# Patient Record
Sex: FEMALE | Race: ASIAN | NOT HISPANIC OR LATINO | Employment: FULL TIME
[De-identification: names, ages, dates, MRNs, and addresses within clinical notes are randomized per-mention and may not be internally consistent; named-entity substitution may affect disease eponyms.]

---

## 2017-01-23 DIAGNOSIS — B97.89 STOMATITIS, VIRAL: Primary | ICD-10-CM

## 2017-01-23 DIAGNOSIS — K12.1 STOMATITIS, VIRAL: Primary | ICD-10-CM

## 2017-07-01 ENCOUNTER — HEALTH MAINTENANCE LETTER (OUTPATIENT)
Age: 32
End: 2017-07-01

## 2017-12-11 ENCOUNTER — TELEPHONE (OUTPATIENT)
Dept: OBGYN | Facility: CLINIC | Age: 32
End: 2017-12-11

## 2017-12-11 DIAGNOSIS — Z34.91 NORMAL PREGNANCY IN FIRST TRIMESTER: Primary | ICD-10-CM

## 2017-12-11 NOTE — TELEPHONE ENCOUNTER
Patient called and would like to establish prenatal care   Patient LMP 10/25/17  Patient   Patient complains of nothing at this time.  Patient is taking prenatal vitamins.

## 2017-12-29 ENCOUNTER — OFFICE VISIT (OUTPATIENT)
Dept: OBGYN | Facility: CLINIC | Age: 32
End: 2017-12-29
Attending: ADVANCED PRACTICE MIDWIFE
Payer: COMMERCIAL

## 2017-12-29 VITALS
DIASTOLIC BLOOD PRESSURE: 84 MMHG | BODY MASS INDEX: 32.93 KG/M2 | SYSTOLIC BLOOD PRESSURE: 132 MMHG | WEIGHT: 167.7 LBS | HEART RATE: 72 BPM | HEIGHT: 60 IN

## 2017-12-29 DIAGNOSIS — O09.91 SUPERVISION OF HIGH RISK PREGNANCY IN FIRST TRIMESTER: Primary | ICD-10-CM

## 2017-12-29 DIAGNOSIS — O34.219 PREVIOUS CESAREAN DELIVERY, ANTEPARTUM CONDITION OR COMPLICATION: ICD-10-CM

## 2017-12-29 DIAGNOSIS — Z34.91 NORMAL PREGNANCY IN FIRST TRIMESTER: ICD-10-CM

## 2017-12-29 LAB
ABO + RH BLD: NORMAL
ABO + RH BLD: NORMAL
BASOPHILS # BLD AUTO: 0 10E9/L (ref 0–0.2)
BASOPHILS NFR BLD AUTO: 0.2 %
BLD GP AB SCN SERPL QL: NORMAL
BLOOD BANK CMNT PATIENT-IMP: NORMAL
DEPRECATED CALCIDIOL+CALCIFEROL SERPL-MC: 19 UG/L (ref 20–75)
DIFFERENTIAL METHOD BLD: NORMAL
EOSINOPHIL # BLD AUTO: 0.1 10E9/L (ref 0–0.7)
EOSINOPHIL NFR BLD AUTO: 1.2 %
ERYTHROCYTE [DISTWIDTH] IN BLOOD BY AUTOMATED COUNT: 12.8 % (ref 10–15)
HBV SURFACE AG SERPL QL IA: NONREACTIVE
HCT VFR BLD AUTO: 41.2 % (ref 35–47)
HGB BLD-MCNC: 13.6 G/DL (ref 11.7–15.7)
HIV 1+2 AB+HIV1 P24 AG SERPL QL IA: NONREACTIVE
IMM GRANULOCYTES # BLD: 0 10E9/L (ref 0–0.4)
IMM GRANULOCYTES NFR BLD: 0.2 %
LYMPHOCYTES # BLD AUTO: 1.7 10E9/L (ref 0.8–5.3)
LYMPHOCYTES NFR BLD AUTO: 17.8 %
MCH RBC QN AUTO: 28.8 PG (ref 26.5–33)
MCHC RBC AUTO-ENTMCNC: 33 G/DL (ref 31.5–36.5)
MCV RBC AUTO: 87 FL (ref 78–100)
MONOCYTES # BLD AUTO: 0.4 10E9/L (ref 0–1.3)
MONOCYTES NFR BLD AUTO: 4.6 %
NEUTROPHILS # BLD AUTO: 7.1 10E9/L (ref 1.6–8.3)
NEUTROPHILS NFR BLD AUTO: 76 %
NRBC # BLD AUTO: 0 10*3/UL
NRBC BLD AUTO-RTO: 0 /100
PLATELET # BLD AUTO: 243 10E9/L (ref 150–450)
RBC # BLD AUTO: 4.72 10E12/L (ref 3.8–5.2)
RUBV IGG SERPL IA-ACNC: 17 IU/ML
SPECIMEN EXP DATE BLD: NORMAL
T PALLIDUM IGG+IGM SER QL: NEGATIVE
WBC # BLD AUTO: 9.3 10E9/L (ref 4–11)

## 2017-12-29 PROCEDURE — 86780 TREPONEMA PALLIDUM: CPT | Performed by: ADVANCED PRACTICE MIDWIFE

## 2017-12-29 PROCEDURE — 87340 HEPATITIS B SURFACE AG IA: CPT | Performed by: ADVANCED PRACTICE MIDWIFE

## 2017-12-29 PROCEDURE — 82306 VITAMIN D 25 HYDROXY: CPT | Performed by: ADVANCED PRACTICE MIDWIFE

## 2017-12-29 PROCEDURE — 86850 RBC ANTIBODY SCREEN: CPT | Performed by: ADVANCED PRACTICE MIDWIFE

## 2017-12-29 PROCEDURE — 85025 COMPLETE CBC W/AUTO DIFF WBC: CPT | Performed by: ADVANCED PRACTICE MIDWIFE

## 2017-12-29 PROCEDURE — 36415 COLL VENOUS BLD VENIPUNCTURE: CPT | Performed by: ADVANCED PRACTICE MIDWIFE

## 2017-12-29 PROCEDURE — 86900 BLOOD TYPING SEROLOGIC ABO: CPT | Performed by: ADVANCED PRACTICE MIDWIFE

## 2017-12-29 PROCEDURE — 86901 BLOOD TYPING SEROLOGIC RH(D): CPT | Performed by: ADVANCED PRACTICE MIDWIFE

## 2017-12-29 PROCEDURE — 87086 URINE CULTURE/COLONY COUNT: CPT | Performed by: ADVANCED PRACTICE MIDWIFE

## 2017-12-29 PROCEDURE — 83021 HEMOGLOBIN CHROMOTOGRAPHY: CPT | Performed by: ADVANCED PRACTICE MIDWIFE

## 2017-12-29 PROCEDURE — 86762 RUBELLA ANTIBODY: CPT | Performed by: ADVANCED PRACTICE MIDWIFE

## 2017-12-29 PROCEDURE — 87389 HIV-1 AG W/HIV-1&-2 AB AG IA: CPT | Performed by: ADVANCED PRACTICE MIDWIFE

## 2017-12-29 PROCEDURE — 76817 TRANSVAGINAL US OBSTETRIC: CPT | Mod: ZF

## 2017-12-29 PROCEDURE — 99212 OFFICE O/P EST SF 10 MIN: CPT | Mod: ZF

## 2017-12-29 RX ORDER — PRENATAL VIT/IRON FUM/FOLIC AC 27MG-0.8MG
1 TABLET ORAL DAILY
COMMUNITY
End: 2021-04-06

## 2017-12-29 NOTE — MR AVS SNAPSHOT
After Visit Summary   2017    Adenike Chavez    MRN: [id number]           Patient Information     Date Of Birth          1985        Visit Information        Provider Department      2017 10:00 AM Nikolay Alicia APRN CNM [facility]        Today's Diagnoses     Supervision of high risk pregnancy in first trimester    -  1    Previous  delivery, antepartum condition or complication           Follow-ups after your visit        Follow-up notes from your care team     Return in about 3 weeks (around 2018) for New OB Visit.      Your next 10 appointments already scheduled     2018  9:00 AM CST   NEW OB with MAGDA Madsen CNM   [facility] ([facility])    [facility]  [address]  [address]  [facility] [address] [phone]              Who to contact     Please call your clinic at [phone] to:    Ask questions about your health    Make or cancel appointments    Discuss your medicines    Learn about your test results    Speak to your doctor   If you have compliments or concerns about an experience at your clinic, or if you wish to file a complaint, please contact [facility] Patient Relations at [phone] or email us at [email or web address]         Additional Information About Your Visit        MyChart Information     [employer] gives you secure access to your electronic health record. If you see a primary care provider, you can also send messages to your care team and make appointments. If you have questions, please call your primary care clinic.  If you do not have a primary care provider, please call [phone] and they will assist you.      Secerno is an electronic gateway that provides easy, online access to your medical records. With [employer], you can request a clinic appointment, read your test results, renew a prescription or  communicate with your care team.     To access your existing account, please contact your [facility] or call [phone] for assistance.        Care EveryWhere ID     This is your Care EveryWhere ID. This could be used by other organizations to access your [place] medical records  [id number]        Your Vitals Were     Pulse Height Last Period BMI (Body Mass Index)          72 1.524 m (5') 10/25/2017 32.75 kg/m2         Blood Pressure from Last 3 Encounters:   12/29/17 132/84   09/13/16 (!) 132/93   10/28/14 127/88    Weight from Last 3 Encounters:   12/29/17 76.1 kg (167 lb 11.2 oz)   09/13/16 75.7 kg (166 lb 14.4 oz)   10/28/14 68.4 kg (150 lb 14.4 oz)              We Performed the Following     25- OH-Vitamin D     ABO/Rh Type and Screen     Anti Treponema     CBC with Platelets Differential     Hepatitis B Surface Antigen     Hgb with reflex to ELP or RBC Solubility (Sickle Cell Screen)     HIV Antigen Antibody Combo     Rubella Antibody IgG Quantitative     Urine Culture Aerobic Bacterial        Primary Care Provider Fax #    Physician No Ref-Primary [phone]       No address on file        Equal Access to Services     DUANE SAMPSON : Hadii jerald Marc, waaxda lurashawn, qaybta kaalmada adeashleyda, lulu samayoa. So [facility] [phone].    ATENCIÓN: Si habla español, tiene a burleson disposición servicios gratuitos de asistencia lingüística. [facility] [phone].    We comply with applicable federal civil rights laws and [place] laws. We do not discriminate on the basis of race, color, national origin, age, disability, sex, sexual orientation, or gender identity.            Thank you!     Thank you for choosing [facility]  for your care. Our goal is always to provide you with excellent care. Hearing back from our patients is one way we can continue to improve our services. Please take a few minutes to complete the  written survey that you may receive in the mail after your visit with us. Thank you!             Your Updated Medication List - Protect others around you: Learn how to safely use, store and throw away your medicines at www.disposemymeds.org.          This list is accurate as of: 12/29/17 10:25 AM.  Always use your most recent med list.                   Brand Name Dispense Instructions for use Diagnosis    albuterol (5 MG/ML) 0.5% neb solution    PROVENTIL    1 vial    Take 0.5 mLs (2.5 mg) by nebulization every 6 hours as needed for wheezing or shortness of breath / dyspnea    Cough       FIRST-ASIYA MOUTHWASH Susp     237 mL    Swish and swallow 5-10 mLs in mouth every 6 hours as needed    Stomatitis, viral       fluticasone 110 MCG/ACT Inhaler    FLOVENT HFA    1 Inhaler    Inhale 2 puffs into the lungs 2 times daily    Cough       prenatal multivitamin plus iron 27-0.8 MG Tabs per tablet      Take 1 tablet by mouth daily

## 2017-12-29 NOTE — PROGRESS NOTES
Subjective:  32 year old female who presents to clinic for initiation of OB care.   at 6w4d with estimated date of delivery of Aug 20, 2018 based on US.  Pregnancy is planned.  Symptoms since LMP include nausea.  Patient has tried these relief measures: increased rest.  Planning trip to [place] in couple weeks, questions regarding medications.  Co-morbids: hx of  x 2.  Medications: PNV  Reviewed dating ultrasound.     PERSONAL/SOCIAL HISTORY  Lives with their family. FOB, Girish, involved.  x 9 years.  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  Additional items: None    Objective  -VS: reviewed and within normal limits   -General appearance: no acute distress, patient is comfortable   NEUROLOGICAL/PSYCHIATRIC   - Orientated x3   -Mood and affect: normal   Genetic/Infection questionnaire completed, risks include none.    Assessment/Plan:   at 6w4d  by ultrasound  Encounter Diagnosis   Name Primary?     Supervision of high risk pregnancy in first trimester Yes     Orders Placed This Encounter   Procedures     25- OH-Vitamin D     Anti Treponema     CBC with Platelets Differential     Hepatitis B Surface Antigen     HIV Antigen Antibody Combo     Rubella Antibody IgG Quantitative     Hgb with reflex to ELP or RBC Solubility (Sickle Cell Screen)     ABO/Rh Type and Screen     Orders Placed This Encounter   Medications     Prenatal Vit-Fe Fumarate-FA (PRENATAL MULTIVITAMIN PLUS IRON) 27-0.8 MG TABS per tablet     Sig: Take 1 tablet by mouth daily     - Discussed medications that are safe to take during pregnancy regarding travel, motion sickness, etc.  - Oriented to Practice, types of care, and how to reach a provider. Discussed CNM vs. MD care. Planning repeat .   - Patient received 1st trimester new OB education packet complete with aide of The Expectant Family booklet including information on genetic screening test options.    - Patient undecided  about 1st trimester screening, will check with insurance.   - Patient was encouraged to continue prenatal vitamins as tolerated.    - Patient was sent to lab for routine OB labs including hgb ELP.    - Pregnancy concerns to be addressed by provider at new OB exam include: needs GC/CT and pap at next visit.  - Patient instructed to schedule new OB exam with provider at 3-4 weeks.

## 2017-12-29 NOTE — MR AVS SNAPSHOT
After Visit Summary   12/29/2017    Adenike Chavez    MRN: [id number]           Patient Information     Date Of Birth          1985        Visit Information        Provider Department      12/29/2017 9:30 AM [facility] ULTRASOUND [facility]        Today's Diagnoses     Normal pregnancy in first trimester           Follow-ups after your visit        Your next 10 appointments already scheduled     Feb 02, 2018  9:00 AM CST   NEW OB with MAGDA Madsen   [facility] ([facility])    [facility] [address]  [address]  [address]  [facility] [id number] [phone]              Who to contact     Please call your clinic at [phone] to:    Ask questions about your health    Make or cancel appointments    Discuss your medicines    Learn about your test results    Speak to your doctor   If you have compliments or concerns about an experience at your clinic, or if you wish to file a complaint, please contact [facility] Patient Relations at [phone] or email us at [email or web address]         Additional Information About Your Visit        MyChart Information     [employer] gives you secure access to your electronic health record. If you see a primary care provider, you can also send messages to your care team and make appointments. If you have questions, please call your primary care clinic.  If you do not have a primary care provider, please call [phone] and they will assist you.      [employer] is an electronic gateway that provides easy, online access to your medical records. With [employer], you can request a clinic appointment, read your test results, renew a prescription or communicate with your care team.     To access your existing account, please contact your [facility] or call [phone] for assistance.        Care EveryWhere ID     This is  your Care EveryWhere ID. This could be used by other organizations to access your [place] medical records  [id number]        Your Vitals Were     Last Period                   10/25/2017            Blood Pressure from Last 3 Encounters:   12/29/17 132/84   09/13/16 (!) 132/93   10/28/14 127/88    Weight from Last 3 Encounters:   12/29/17 76.1 kg (167 lb 11.2 oz)   09/13/16 75.7 kg (166 lb 14.4 oz)   10/28/14 68.4 kg (150 lb 14.4 oz)              We Performed the Following     Dating ultrasound less than 14 weeks gestation Transvag  - 10261 (In Clinic)        Primary Care Provider Fax #    Physician No Ref-Primary [phone]       No address on file        Equal Access to Services     DUANE SAMPSON : Irwin Marc, waaxpinky luqadaha, qaybta kaalmada jona, lulu gonzalez . So [facility] [phone].    ATENCIÓN: Si habla español, tiene a burleson disposición servicios gratuitos de asistencia lingüística. Llame al [phone].    We comply with applicable federal civil rights laws and [place] laws. We do not discriminate on the basis of race, color, national origin, age, disability, sex, sexual orientation, or gender identity.            Thank you!     Thank you for choosing [facility]  for your care. Our goal is always to provide you with excellent care. Hearing back from our patients is one way we can continue to improve our services. Please take a few minutes to complete the written survey that you may receive in the mail after your visit with us. Thank you!             Your Updated Medication List - Protect others around you: Learn how to safely use, store and throw away your medicines at www.disposemymeds.org.          This list is accurate as of: 12/29/17  1:34 PM.  Always use your most recent med list.                   Brand Name Dispense Instructions for use Diagnosis    albuterol (5 MG/ML) 0.5% neb solution    PROVENTIL    1 vial    Take 0.5 mLs  (2.5 mg) by nebulization every 6 hours as needed for wheezing or shortness of breath / dyspnea    Cough       FIRST-ASIYA MOUTHWASH Susp     237 mL    Swish and swallow 5-10 mLs in mouth every 6 hours as needed    Stomatitis, viral       fluticasone 110 MCG/ACT Inhaler    FLOVENT HFA    1 Inhaler    Inhale 2 puffs into the lungs 2 times daily    Cough       prenatal multivitamin plus iron 27-0.8 MG Tabs per tablet      Take 1 tablet by mouth daily

## 2017-12-29 NOTE — LETTER
Date:January 2, 2018      Patient was self referred, no letter generated. Do not send.        [facility] Phys[facility] Health Information

## 2017-12-29 NOTE — PROGRESS NOTES
32 year old female, , with LMP 10/25/2017, 9 2/7 weeks. Estimated Date of Delivery: 2018,  presents for confirmation of dates and assessment of viability. This study was done transvaginally.    Measurements     CRL = 6.5 mm = 6 4/7 weeks  EGA.   INDRA = 2018.     Fetal pole and yolk sac identified.     Fetal/Fetal Cardiac Activity: Present.  FHR = 136bpm.     Implantation: Intrauterine.     Cervix = 3.4 cm      Maternal structures appear normal.    Impression: US today changes EDC to 2018 (not consistent with LMP).    Recommend comprehensive scan at 18 to 20 weeks.    MIKE Murphy MD MPH

## 2017-12-29 NOTE — LETTER
2017       RE: Adenike Chavez  [address] [place] [address]     Dear Colleague,    Thank you for referring your patient, Adenike Chavez, to the [facility] at [facility]. Please see a copy of my visit note below.    Subjective:  32 year old female who presents to clinic for initiation of OB care.   at 6w4d with estimated date of delivery of Aug 20, 2018 based on US.  Pregnancy is planned.  Symptoms since LMP include nausea.  Patient has tried these relief measures: increased rest.  Planning trip to [place] in couple weeks, questions regarding medications.  Co-morbids: hx of  x 2.  Medications: PNV  Reviewed dating ultrasound.     PERSONAL/SOCIAL HISTORY  Lives with their family. Melba BROOKSi, involved.  x 9 years.  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  Additional items: None    Objective  -VS: reviewed and within normal limits   -General appearance: no acute distress, patient is comfortable   NEUROLOGICAL/PSYCHIATRIC   - Orientated x3   -Mood and affect: normal   Genetic/Infection questionnaire completed, risks include none.    Assessment/Plan:   at 6w4d  by ultrasound  Encounter Diagnosis   Name Primary?     Supervision of high risk pregnancy in first trimester Yes     Orders Placed This Encounter   Procedures     25- OH-Vitamin D     Anti Treponema     CBC with Platelets Differential     Hepatitis B Surface Antigen     HIV Antigen Antibody Combo     Rubella Antibody IgG Quantitative     Hgb with reflex to ELP or RBC Solubility (Sickle Cell Screen)     ABO/Rh Type and Screen     Orders Placed This Encounter   Medications     Prenatal Vit-Fe Fumarate-FA (PRENATAL MULTIVITAMIN PLUS IRON) 27-0.8 MG TABS per tablet     Sig: Take 1 tablet by mouth daily     - Discussed medications that are safe to take during pregnancy regarding travel, motion sickness, etc.  - Oriented to Practice, types  of care, and how to reach a provider. Discussed CNM vs. MD care. Planning repeat .   - Patient received 1st trimester new OB education packet complete with aide of The Expectant Family booklet including information on genetic screening test options.    - Patient undecided about 1st trimester screening, will check with insurance.   - Patient was encouraged to continue prenatal vitamins as tolerated.    - Patient was sent to lab for routine OB labs including hgb ELP.    - Pregnancy concerns to be addressed by provider at new OB exam include: needs GC/CT and pap at next visit.  - Patient instructed to schedule new OB exam with provider at 3-4 weeks.      Again, thank you for allowing me to participate in the care of your patient.      Sincerely,    MAGDA Lentz CNM

## 2017-12-29 NOTE — LETTER
Date:January 2, 2018      Patient was self referred, no letter generated. Do not send.        [facility] Physicians Health Information

## 2017-12-29 NOTE — LETTER
2017       RE: Adenike Chavez  [address] [place] [address]     Dear Colleague,    Thank you for referring your patient, Adenike Chavez, to the [facility] at [facility]. Please see a copy of my visit note below.    32 year old female, , with LMP 10/25/2017, 9 2/7 weeks. Estimated Date of Delivery: 2018,  presents for confirmation of dates and assessment of viability. This study was done transvaginally.    Measurements     CRL = 6.5 mm = 6 4/7 weeks  EGA.   INDRA = 2018.     Fetal pole and yolk sac identified.     Fetal/Fetal Cardiac Activity: Present.  FHR = 136bpm.     Implantation: Intrauterine.     Cervix = 3.4 cm      Maternal structures appear normal.    Impression: US today changes EDC to 2018 (not consistent with LMP).    Recommend comprehensive scan at 18 to 20 weeks.    MIKE Murphy MD MPH            Again, thank you for allowing me to participate in the care of your patient.      Sincerely,    [facility]

## 2017-12-30 LAB
BACTERIA SPEC CULT: NO GROWTH
HGB A1 MFR BLD: 96.7 % (ref 95–97.9)
HGB A2 MFR BLD: 2.9 % (ref 2–3.5)
HGB C MFR BLD: 0 % (ref 0–0)
HGB E MFR BLD: 0 % (ref 0–0)
HGB F MFR BLD: 0.4 % (ref 0–2.1)
HGB FRACT BLD ELPH-IMP: NORMAL
HGB OTHER MFR BLD: 0 % (ref 0–0)
HGB S BLD QL SOLY: NORMAL
HGB S MFR BLD: 0 % (ref 0–0)
Lab: NORMAL
PATH INTERP BLD-IMP: NORMAL
SPECIMEN SOURCE: NORMAL

## 2018-01-01 PROBLEM — E55.9 VITAMIN D DEFICIENCY: Status: ACTIVE | Noted: 2018-01-01

## 2018-02-02 ENCOUNTER — OFFICE VISIT (OUTPATIENT)
Dept: OBGYN | Facility: CLINIC | Age: 33
End: 2018-02-02
Attending: ADVANCED PRACTICE MIDWIFE
Payer: COMMERCIAL

## 2018-02-02 VITALS
HEART RATE: 91 BPM | BODY MASS INDEX: 33.22 KG/M2 | DIASTOLIC BLOOD PRESSURE: 82 MMHG | HEIGHT: 60 IN | SYSTOLIC BLOOD PRESSURE: 126 MMHG | WEIGHT: 169.2 LBS

## 2018-02-02 DIAGNOSIS — E55.9 VITAMIN D DEFICIENCY: ICD-10-CM

## 2018-02-02 DIAGNOSIS — Z11.3 SCREEN FOR STD (SEXUALLY TRANSMITTED DISEASE): ICD-10-CM

## 2018-02-02 DIAGNOSIS — Z12.4 SCREENING FOR MALIGNANT NEOPLASM OF CERVIX: ICD-10-CM

## 2018-02-02 DIAGNOSIS — O09.91 HIGH-RISK PREGNANCY IN FIRST TRIMESTER: Primary | ICD-10-CM

## 2018-02-02 PROCEDURE — G0145 SCR C/V CYTO,THINLAYER,RESCR: HCPCS | Performed by: ADVANCED PRACTICE MIDWIFE

## 2018-02-02 PROCEDURE — 87491 CHLMYD TRACH DNA AMP PROBE: CPT | Performed by: ADVANCED PRACTICE MIDWIFE

## 2018-02-02 PROCEDURE — G0463 HOSPITAL OUTPT CLINIC VISIT: HCPCS | Mod: ZF

## 2018-02-02 PROCEDURE — 87591 N.GONORRHOEAE DNA AMP PROB: CPT | Performed by: ADVANCED PRACTICE MIDWIFE

## 2018-02-02 PROCEDURE — 87624 HPV HI-RISK TYP POOLED RSLT: CPT | Performed by: ADVANCED PRACTICE MIDWIFE

## 2018-02-02 RX ORDER — CALCIUM CARBONATE 500 MG/1
1 TABLET, CHEWABLE ORAL DAILY
COMMUNITY
End: 2018-09-25

## 2018-02-02 NOTE — LETTER
2018       RE: Adenike Chavez  [address] [place] [address]     Dear Colleague,    Thank you for referring your patient, Adenike Chavez, to the [facility] at [facility]. Please see a copy of my visit note below.    SUBJECTIVE:    32 year old, female, , 11w4d,  who presents to the clinic today for a new ob visit.    Feels well. Has started PNV.  Estimated Date of Delivery: Aug 20, 2018   Aug 20, 2018 is calculated from Patient's last menstrual period was 10/25/2017..      She has not had bleeding since her LMP.   She has had mild nausea. Weight loss has not occurred.   This was a planned pregnancy.   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     OTHER CONCERNS: none    ===========================================  ROS  PSYCHIATRIC:  Denies mood changes  PHQ9: Last PHQ-9 score on record= 0  Social History   Substance Use Topics     Smoking status: Never Smoker     Smokeless tobacco: Never Used     Alcohol use No      Comment: Not with pregnancy - once every 2 months     History   Drug Use No     History   Smoking Status     Never Smoker   Smokeless Tobacco     Never Used       Alcohol use No   Comment: Not with pregnancy - once every 2 months     Family History   Problem Relation Age of Onset     Hypertension Father      stroke     Genitourinary Problems Maternal Grandmother      kidney      ============================================  MEDICAL HISTORY   No Known Allergies    [unfilled]      Current Outpatient Prescriptions:      calcium carbonate (TUMS) 500 MG chewable tablet, Take 1 chew tab by mouth daily, Disp: , Rfl:      Prenatal Vit-Fe Fumarate-FA (PRENATAL MULTIVITAMIN PLUS IRON) 27-0.8 MG TABS per tablet, Take 1 tablet by mouth daily, Disp: , Rfl:      Diphenhyd-HC-Nystatin-Tetracyc (FIRST-ASIYA MOUTHWASH) SUSP, Swish and swallow 5-10 mLs in mouth every 6 hours as needed (Patient not taking: Reported on 2017), Disp:  237 mL, Rfl: 1     fluticasone (FLOVENT HFA) 110 MCG/ACT inhaler, Inhale 2 puffs into the lungs 2 times daily (Patient not taking: Reported on 2017), Disp: 1 Inhaler, Rfl: 1     albuterol (PROVENTIL) (5 MG/ML) 0.5% nebulizer solution, Take 0.5 mLs (2.5 mg) by nebulization every 6 hours as needed for wheezing or shortness of breath / dyspnea (Patient not taking: Reported on 2017), Disp: 1 vial, Rfl: 1  No current facility-administered medications for this visit.     Facility-Administered Medications Ordered in Other Visits:      lidocaine-EPINEPHrine 1.5 %-1:[id number] injection, , EPIDURAL, PRN, Rohit Patel MD, 3 mL at 14 0240     fentaNYL (SUBLIMAZE) 2 mcg/mL, bupivacaine (MARCAINE) 0.125% in  mL EPIDURAL Drip, , EPIDURAL, Continuous PRN, Rohit Patel MD, Last Rate: 14 mL/hr at 14 0244, 14 mL/hr at 14 0244     bupivacaine HCl 0.25 % preservative free injection SOLN, , EPIDURAL, PRN, Rohit Patel MD, 12 mL at 14 0240     fentaNYL (SUBLIMAZE) injection, , EPIDURAL, PRN, Rohit Patel MD, 100 mcg at 14 0240    Past Medical History:   Diagnosis Date     NO ACTIVE PROBLEMS        Past Surgical History:   Procedure Laterality Date     C/SECTION, LOW TRANSVERSE  9/12/10    fail to dilate - malposition      SECTION N/A 2014    Procedure:  SECTION;  Surgeon: Tyra Richardson MD;  Location: [facility] L+D     HC TOOTH EXTRACTION W/FORCEP      wisdom - age 21 years old            Obstetric History       T2      L2     SAB0   TAB0   Ectopic0   Multiple0   Live Births2       # Outcome Date GA Lbr Lorne/2nd Weight Sex Delivery Anes PTL Lv   3 Current            2 Term 14 39w0d / 05:38 3.487 kg (7 lb 11 oz) M CS-LTranv EPI  RAQUEL      Apgar1:  9               Apgar5: 10   1 Term 09/12/10 39w5d  3.515 kg (7 lb 12 oz) M -SEC   RAQUEL      Name: jennie      Apgar1:  8                Apgar5: 9      Obstetric Comments   No GDM, HTN, or PPH.       GYN  History- Denies Abnormal Pap Smears                        Cervical procedures: none                        History of STI: none    I personally reviewed the past social/family/medical and surgical history on the date of service.   I reviewed lab work done at Intake visit with patient.    OBJECTIVE:   PHYSICAL EXAM:  /82  Pulse 91  Ht 1.524 m (5')  Wt 76.7 kg (169 lb 3.2 oz)  LMP 10/25/2017  BMI 33.04 kg/m2  BMI- Body mass index is 33.04 kg/(m^2)., GENERAL:  Pleasant pregnant female, alert, cooperative and well groomed.  SKIN:  Warm and dry, without lesions or rashes  HEAD: Symmetrical features.  MOUTH:  Buccal mucosa pink, moist without lesions.  Teeth in good repair.    NECK:  Thyroid without enlargement and nodules.  Lymph nodes not palpable.   LUNGS:  Clear to auscultation.  BREAST:    No dominant, fixed or suspicious masses are noted.  No skin or nipple changes or axillary nodes.   Nipples everted.      HEART:  RRR without murmur.  ABDOMEN: Soft without masses , tenderness or organomegaly.  No CVA tenderness.  Uterus not palpable at size equal to dates.  Well healed scar from  section. Fetal heart tones present.  MUSCULOSKELETAL:  Full range of motion  EXTREMITIES:  No edema. No significant varicosities.   PELVIC EXAM:  GENITALIA: EGBUS  External genitalia, Bartholin's glands, urethra & [place]'s glands:normal. Vulva reveals no erythema or lesions.         VAGINA:  pink, normal rugae and discharge, no lesions, good tone.   CERVIX:  smooth, without discharge or CMT.                RECTAL:  Normal appearance.  Digital exam deferred.  WET PREP:Not done  GC/CHLAMYDIA CULTURE OBTAINED:YES    ASSESSMENT:  Intrauterine pregnancy 11w4d size consistent with dates  Genetic Screening: Declines early screening  Encounter Diagnoses   Name Primary?     High-risk pregnancy in first trimester Yes     Screening for malignant neoplasm of cervix      Screen for STD (sexually transmitted disease)          PLAN:  Orders Placed This Encounter   Procedures     Obtaining, preparing and conveyance of cervical or vaginal smear to laboratory.     Pap imaged thin layer screen with HPV - recommended age 30 - 65 years (select HPV order below)     HPV High Risk Types DNA Cervical     Orders Placed This Encounter   Medications     calcium carbonate (TUMS) 500 MG chewable tablet     Sig: Take 1 chew tab by mouth daily     - Reviewed use of triage nurse line and contacting the on-call provider after hours for an urgent need such as fever, vagina bleeding, bladder or vaginal infection, rupture of membranes,  or term labor.    - Reviewed best evidence for: weight gain for her weight and height for pregnancy:  RECOMMENDED WEIGHT GAIN: < 15 lbs.   healthy diet and foods to avoid; exercise and activity during pregnancy;avoiding exposure to toxoplasmosis; and maintenance of a generally healthy lifestyle.   - Discussed the harms, benefits, side effects and alternative therapies for current prescribed and OTC medications.    - All pt's and FOB's  questions discussed and answered.  Pt verbalized understanding of and agreement to plan of care.     - Continue scheduled prenatal care and prn if questions or concerns    MAGDA Madsen CNM                 Again, thank you for allowing me to participate in the care of your patient.      Sincerely,    MAGDA Madsen CNM

## 2018-02-02 NOTE — PROGRESS NOTES
SUBJECTIVE:    32 year old, female, , 11w4d,  who presents to the clinic today for a new ob visit.    Feels well. Has started PNV.  Estimated Date of Delivery: Aug 20, 2018   Aug 20, 2018 is calculated from Patient's last menstrual period was 10/25/2017..      She has not had bleeding since her LMP.   She has had mild nausea. Weight loss has not occurred.   This was a planned pregnancy.   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     OTHER CONCERNS: none    ===========================================  ROS  PSYCHIATRIC:  Denies mood changes  PHQ9: Last PHQ-9 score on record= 0  Social History   Substance Use Topics     Smoking status: Never Smoker     Smokeless tobacco: Never Used     Alcohol use No      Comment: Not with pregnancy - once every 2 months     History   Drug Use No     History   Smoking Status     Never Smoker   Smokeless Tobacco     Never Used       Alcohol use No   Comment: Not with pregnancy - once every 2 months     Family History   Problem Relation Age of Onset     Hypertension Father      stroke     Genitourinary Problems Maternal Grandmother      kidney      ============================================  MEDICAL HISTORY   No Known Allergies    [unfilled]      Current Outpatient Prescriptions:      calcium carbonate (TUMS) 500 MG chewable tablet, Take 1 chew tab by mouth daily, Disp: , Rfl:      Prenatal Vit-Fe Fumarate-FA (PRENATAL MULTIVITAMIN PLUS IRON) 27-0.8 MG TABS per tablet, Take 1 tablet by mouth daily, Disp: , Rfl:      Diphenhyd-HC-Nystatin-Tetracyc (FIRST-ASIYA MOUTHWASH) SUSP, Swish and swallow 5-10 mLs in mouth every 6 hours as needed (Patient not taking: Reported on 2017), Disp: 237 mL, Rfl: 1     fluticasone (FLOVENT HFA) 110 MCG/ACT inhaler, Inhale 2 puffs into the lungs 2 times daily (Patient not taking: Reported on 2017), Disp: 1 Inhaler, Rfl: 1     albuterol (PROVENTIL) (5 MG/ML) 0.5% nebulizer solution, Take 0.5 mLs (2.5 mg) by nebulization every  6 hours as needed for wheezing or shortness of breath / dyspnea (Patient not taking: Reported on 2017), Disp: 1 vial, Rfl: 1  No current facility-administered medications for this visit.     Facility-Administered Medications Ordered in Other Visits:      lidocaine-EPINEPHrine 1.5 %-[id number] injection, , EPIDURAL, PRN, Rohit Patel MD, 3 mL at 14 0240     fentaNYL (SUBLIMAZE) 2 mcg/mL, bupivacaine (MARCAINE) 0.125% in  mL EPIDURAL Drip, , EPIDURAL, Continuous PRN, Rohit Patel MD, Last Rate: 14 mL/hr at 14 0244, 14 mL/hr at 14 0244     bupivacaine HCl 0.25 % preservative free injection SOLN, , EPIDURAL, PRN, Rohit Patel MD, 12 mL at 14 0240     fentaNYL (SUBLIMAZE) injection, , EPIDURAL, PRN, Rohit Patel MD, 100 mcg at 14 0240    Past Medical History:   Diagnosis Date     NO ACTIVE PROBLEMS        Past Surgical History:   Procedure Laterality Date     C/SECTION, LOW TRANSVERSE  9/12/10    fail to dilate - malposition      SECTION N/A 2014    Procedure:  SECTION;  Surgeon: Tyra Richardson MD;  Location: [facility] L[facility]     HC TOOTH EXTRACTION W/FORCEP      wisdom - age 21 years old            Obstetric History       T2      L2     SAB0   TAB0   Ectopic0   Multiple0   Live Births2       # Outcome Date GA Lbr Lorne/2nd Weight Sex Delivery Anes PTL Lv   3 Current            2 Term 14 39w0d / 05:38 3.487 kg (7 lb 11 oz) M CS-LTranv EPI  RAQUEL      Apgar1:  9               Apgar5: 10   1 Term 09/12/10 39w5d  3.515 kg (7 lb 12 oz) M -SEC   RAQUEL      Name: jennie      Apgar1:  8                Apgar5: 9      Obstetric Comments   No GDM, HTN, or PPH.       GYN History- Denies Abnormal Pap Smears                        Cervical procedures: none                        History of STI: none    I personally reviewed the past social/family/medical and surgical history on the date of service.   I reviewed lab work done at Intake visit with  patient.    OBJECTIVE:   PHYSICAL EXAM:  /82  Pulse 91  Ht 1.524 m (5')  Wt 76.7 kg (169 lb 3.2 oz)  LMP 10/25/2017  BMI 33.04 kg/m2  BMI- Body mass index is 33.04 kg/(m^2)., GENERAL:  Pleasant pregnant female, alert, cooperative and well groomed.  SKIN:  Warm and dry, without lesions or rashes  HEAD: Symmetrical features.  MOUTH:  Buccal mucosa pink, moist without lesions.  Teeth in good repair.    NECK:  Thyroid without enlargement and nodules.  Lymph nodes not palpable.   LUNGS:  Clear to auscultation.  BREAST:    No dominant, fixed or suspicious masses are noted.  No skin or nipple changes or axillary nodes.   Nipples everted.      HEART:  RRR without murmur.  ABDOMEN: Soft without masses , tenderness or organomegaly.  No CVA tenderness.  Uterus not palpable at size equal to dates.  Well healed scar from  section. Fetal heart tones present.  MUSCULOSKELETAL:  Full range of motion  EXTREMITIES:  No edema. No significant varicosities.   PELVIC EXAM:  GENITALIA: EGBUS  External genitalia, Bartholin's glands, urethra & Shorter's glands:normal. Vulva reveals no erythema or lesions.         VAGINA:  pink, normal rugae and discharge, no lesions, good tone.   CERVIX:  smooth, without discharge or CMT.                RECTAL:  Normal appearance.  Digital exam deferred.  WET PREP:Not done  GC/CHLAMYDIA CULTURE OBTAINED:YES    ASSESSMENT:  Intrauterine pregnancy 11w4d size consistent with dates  Genetic Screening: Declines early screening  Encounter Diagnoses   Name Primary?     High-risk pregnancy in first trimester Yes     Screening for malignant neoplasm of cervix      Screen for STD (sexually transmitted disease)         PLAN:  Orders Placed This Encounter   Procedures     Obtaining, preparing and conveyance of cervical or vaginal smear to laboratory.     Pap imaged thin layer screen with HPV - recommended age 30 - 65 years (select HPV order below)     HPV High Risk Types DNA Cervical     Orders Placed  This Encounter   Medications     calcium carbonate (TUMS) 500 MG chewable tablet     Sig: Take 1 chew tab by mouth daily     - Reviewed use of triage nurse line and contacting the on-call provider after hours for an urgent need such as fever, vagina bleeding, bladder or vaginal infection, rupture of membranes,  or term labor.    - Reviewed best evidence for: weight gain for her weight and height for pregnancy:  RECOMMENDED WEIGHT GAIN: < 15 lbs.   healthy diet and foods to avoid; exercise and activity during pregnancy;avoiding exposure to toxoplasmosis; and maintenance of a generally healthy lifestyle.   - Discussed the harms, benefits, side effects and alternative therapies for current prescribed and OTC medications.    - All pt's and FOB's  questions discussed and answered.  Pt verbalized understanding of and agreement to plan of care.     - Continue scheduled prenatal care and prn if questions or concerns    MAGDA Madsen CNM

## 2018-02-02 NOTE — PATIENT INSTRUCTIONS
"  Thank you for choosing [facility] for your obstetrical care.  We are an integrated health clinic with obstetricians, midwives, a psychologist, an acupuncturist, a nutritionist, a pharmacist, internal medicine and family practice all in one.  If you have questions about services offered please ask.      o Please keep all appointments with your provider.  You will help catch, prevent and treat problems early.  o Review your Expectant Family booklet and folder given to you at this intake visit.  It can answer many basic questions including:    Treatment for nausea and vomiting    Medications that are safe in pregnancy    Healthy diet and weight gain    Exercise and activity  o ASK questions!!  Please use \"Questions for my New OB visit\" form to write down any questions or concerns for your next visit.  o Eat a healthy diet.  Visit www.choosemyplate.gov and click on  Pregnancy and Breastfeeding  for information and tips  o Do not smoke.  Avoid other people's smoke, too.  We are happy to help with referrals to stop smoking programs.  o Do not drink alcohol.  o Try to avoid people who have colds or other infections.  Practice good hand washing.  o Consider registering for our Healthy Pregnancy Class here at [facility].  This class is offered every 3rd Wednesday from 2:30-4:30 p.m.  [place] at [phone] or online at [email or web address] or [email or web address]  o Consider registering for prenatal education classes through [place] at [facility].  You can view class schedules and register online at [email or web address] or call [phone] for questions     For urgent concerns, call [facility] at [phone] to speak with a triage nurse during regular clinic hours (8:00 am - 4:30 pm).  This line is answered by our service 24 hours a day, after hours a provider will return your call.  "

## 2018-02-02 NOTE — MR AVS SNAPSHOT
"              After Visit Summary   2/2/2018    Adenike Chavez    MRN: [id number]           Patient Information     Date Of Birth          1985        Visit Information        Provider Department      2/2/2018 9:00 AM Bridget Maldonado APRN CNM [facility]        Today's Diagnoses     High-risk pregnancy in first trimester    -  1    Screening for malignant neoplasm of cervix        Screen for STD (sexually transmitted disease)        Vitamin D deficiency          Care Instructions      Thank you for choosing [facility] for your obstetrical care.  We are an integrated health clinic with obstetricians, midwives, a psychologist, an acupuncturist, a nutritionist, a pharmacist, internal medicine and family practice all in one.  If you have questions about services offered please ask.      o Please keep all appointments with your provider.  You will help catch, prevent and treat problems early.  o Review your Expectant Family booklet and folder given to you at this intake visit.  It can answer many basic questions including:    Treatment for nausea and vomiting    Medications that are safe in pregnancy    Healthy diet and weight gain    Exercise and activity  o ASK questions!!  Please use \"Questions for my New OB visit\" form to write down any questions or concerns for your next visit.  o Eat a healthy diet.  Visit www.choosemyplate.gov and click on  Pregnancy and Breastfeeding  for information and tips  o Do not smoke.  Avoid other people's smoke, too.  We are happy to help with referrals to stop smoking programs.  o Do not drink alcohol.  o Try to avoid people who have colds or other infections.  Practice good hand washing.  o Consider registering for our Healthy Pregnancy Class here at [facility].  This class is offered every 3rd Wednesday from 2:30-4:30 p.m.  [place] at [phone] or online at [email or web address] or [email or web address]  o Consider " registering for prenatal education classes through [place] at [facility].  You can view class schedules and register online at [email or web address] or call [phone] for questions     For urgent concerns, call [facility] at [phone] to speak with a triage nurse during regular clinic hours (8:00 am - 4:30 pm).  This line is answered by our service 24 hours a day, after hours a provider will return your call.          Follow-ups after your visit        Follow-up notes from your care team     Return in about 4 weeks (around 3/2/2018) for THALIA NAM only.      Who to contact     Please call your clinic at [phone] to:    Ask questions about your health    Make or cancel appointments    Discuss your medicines    Learn about your test results    Speak to your doctor   If you have compliments or concerns about an experience at your clinic, or if you wish to file a complaint, please contact [facility] Patient Relations at [phone] or email us at [email or web address]         Additional Information About Your Visit        Accelerated IOhart Information     BrandContt gives you secure access to your electronic health record. If you see a primary care provider, you can also send messages to your care team and make appointments. If you have questions, please call your primary care clinic.  If you do not have a primary care provider, please call [phone] and they will assist you.      allyve is an electronic gateway that provides easy, online access to your medical records. With allyve, you can request a clinic appointment, read your test results, renew a prescription or communicate with your care team.     To access your existing account, please contact your [facility] or call [phone] for assistance.        Care EveryWhere ID     This is your Care EveryWhere ID. This could be used by other organizations to access your [place]  medical records  [id number]        Your Vitals Were     Pulse Height Last Period BMI (Body Mass Index)          91 1.524 m (5') 10/25/2017 33.04 kg/m2         Blood Pressure from Last 3 Encounters:   02/02/18 126/82   12/29/17 132/84   09/13/16 (!) 132/93    Weight from Last 3 Encounters:   02/02/18 76.7 kg (169 lb 3.2 oz)   12/29/17 76.1 kg (167 lb 11.2 oz)   09/13/16 75.7 kg (166 lb 14.4 oz)              We Performed the Following     Chlamydia Trachomatis PCR [BEA311]     Gonorrhea PCR [[id number]]     HPV High Risk Types DNA Cervical     Obtaining, preparing and conveyance of cervical or vaginal smear to laboratory.     Pap imaged thin layer screen with HPV - recommended age 30 - 65 years (select HPV order below)        Primary Care Provider Fax #    Physician No Ref-Primary [phone]       No address on file        Equal Access to Services     DUANE SAMPSON : Hadrigo dominguezo Socrystal, wairisda lurashawn, qaybta kaalmada jona, lulu gonzalez . So [facility] [phone].    ATENCIÓN: Si margarette hurst, tiene a burleson disposición servicios gratuitos de asistencia lingüística. Llame al [phone].    We comply with applicable federal civil rights laws and [place] laws. We do not discriminate on the basis of race, color, national origin, age, disability, sex, sexual orientation, or gender identity.            Thank you!     Thank you for choosing [facility]  for your care. Our goal is always to provide you with excellent care. Hearing back from our patients is one way we can continue to improve our services. Please take a few minutes to complete the written survey that you may receive in the mail after your visit with us. Thank you!             Your Updated Medication List - Protect others around you: Learn how to safely use, store and throw away your medicines at www.disposemymeds.org.          This list is accurate as of 2/2/18  9:33 AM.  Always use your most  recent med list.                   Brand Name Dispense Instructions for use Diagnosis    albuterol (5 MG/ML) 0.5% neb solution    PROVENTIL    1 vial    Take 0.5 mLs (2.5 mg) by nebulization every 6 hours as needed for wheezing or shortness of breath / dyspnea    Cough       calcium carbonate 500 MG chewable tablet    TUMS     Take 1 chew tab by mouth daily        FIRST-ASIYA MOUTHWASH Susp     237 mL    Swish and swallow 5-10 mLs in mouth every 6 hours as needed    Stomatitis, viral       fluticasone 110 MCG/ACT Inhaler    FLOVENT HFA    1 Inhaler    Inhale 2 puffs into the lungs 2 times daily    Cough       prenatal multivitamin plus iron 27-0.8 MG Tabs per tablet      Take 1 tablet by mouth daily

## 2018-02-02 NOTE — LETTER
Date:February 5, 2018      Patient was self referred, no letter generated. Do not send.        [facility] Health Information

## 2018-02-04 LAB
C TRACH DNA SPEC QL NAA+PROBE: NEGATIVE
N GONORRHOEA DNA SPEC QL NAA+PROBE: NEGATIVE
SPECIMEN SOURCE: NORMAL
SPECIMEN SOURCE: NORMAL

## 2018-02-06 LAB
COPATH REPORT: NORMAL
PAP: NORMAL

## 2018-02-08 LAB
FINAL DIAGNOSIS: NORMAL
HPV HR 12 DNA CVX QL NAA+PROBE: NEGATIVE
HPV16 DNA SPEC QL NAA+PROBE: NEGATIVE
HPV18 DNA SPEC QL NAA+PROBE: NEGATIVE
SPECIMEN DESCRIPTION: NORMAL
SPECIMEN SOURCE CVX/VAG CYTO: NORMAL

## 2018-03-13 ENCOUNTER — OFFICE VISIT (OUTPATIENT)
Dept: OBGYN | Facility: CLINIC | Age: 33
End: 2018-03-13
Attending: OBSTETRICS & GYNECOLOGY
Payer: COMMERCIAL

## 2018-03-13 VITALS
DIASTOLIC BLOOD PRESSURE: 83 MMHG | HEIGHT: 60 IN | BODY MASS INDEX: 34.47 KG/M2 | WEIGHT: 175.6 LBS | HEART RATE: 92 BPM | SYSTOLIC BLOOD PRESSURE: 130 MMHG

## 2018-03-13 DIAGNOSIS — Z34.82 PRENATAL CARE, SUBSEQUENT PREGNANCY, SECOND TRIMESTER: Primary | ICD-10-CM

## 2018-03-13 NOTE — PROGRESS NOTES
33 yo  at 17+1 with c/s x 2 here for routine OB. 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 Debra did last c/s kids all 4 years apart.  Planning on tubal ligation. Discussed salpingectomy.  Declines genetic testing, but does want level 2 ultrasound.    O see flowsheet    A/P IUP at 17+1  Plans repeat c/s at 39 weeks with salpingectomy  Discussed weight gain gola of 25 #.  RTC in 5 weeks  Ordered level 2 sono.    Tyra Richardson MD

## 2018-03-13 NOTE — LETTER
Date:March 14, 2018      Patient was self referred, no letter generated. Do not send.        [facility] Health Information

## 2018-03-13 NOTE — MR AVS SNAPSHOT
After Visit Summary   3/13/2018    Adenike Chavez    MRN: [id number]           Patient Information     Date Of Birth          1985        Visit Information        Provider Department      3/13/2018 2:45 PM Tyra Richardson MD [facility]        Today's Diagnoses     Prenatal care, subsequent pregnancy, second trimester    -  1       Follow-ups after your visit        Additional Services     MAT FETAL MED CTR REFERRAL-PREGNANCY       >> Patient may proceed with recommendations for further testing as directed by the Maternal Fetal Medicine Specialist >>    >> If requesting Fetal Echo: MFM will determine appropriate location for exam due to indication.    >> If requesting Lung Maturity Amnio:  If results indicate fetal lung maturity, induction or C/S is recommended within 36 hours.  Please schedule accordingly.     Dear Patient:   Please be aware that coverage of these services is subject to the terms and limitations of your health insurance plan.  Call member services at your health plan with any benefit or coverage questions.      Please bring the following to your appointment:    >>  Any x-rays, CTs or MRIs which have been performed.  Contact the facility where they were done to arrange for  prior to your scheduled appointment.  Any new CT, MRI or other procedures ordered by your specialist must be performed at a [place] facility or coordinated by your clinic's referral office.  >>  List of current medications   >>  This referral request   >>  Any documents/labs given to you for this referral                  Your next 10 appointments already scheduled     Apr 18, 2018  3:30 PM CDT   RETURN OB with Tyra Richardson MD   [facility] ([facility])    [facility] [address]  [address]  [facility] [id number] [phone]              Who to contact     Please call your clinic at [phone]  to:    Ask questions about your health    Make or cancel appointments    Discuss your medicines    Learn about your test results    Speak to your doctor            Additional Information About Your Visit        MyWishBoardharNutmeg Information     ChemDAQ gives you secure access to your electronic health record. If you see a primary care provider, you can also send messages to your care team and make appointments. If you have questions, please call your primary care clinic.  If you do not have a primary care provider, please call [phone] and they will assist you.      ChemDAQ is an electronic gateway that provides easy, online access to your medical records. With ChemDAQ, you can request a clinic appointment, read your test results, renew a prescription or communicate with your care team.     To access your existing account, please contact your [facility] or call [phone] for assistance.        Care EveryWhere ID     This is your Care EveryWhere ID. This could be used by other organizations to access your [place] medical records  [id number]        Your Vitals Were     Pulse Height Last Period BMI (Body Mass Index)          92 1.524 m (5') 10/25/2017 34.29 kg/m2         Blood Pressure from Last 3 Encounters:   03/13/18 130/83   02/02/18 126/82   12/29/17 132/84    Weight from Last 3 Encounters:   03/13/18 79.7 kg (175 lb 9.6 oz)   02/02/18 76.7 kg (169 lb 3.2 oz)   12/29/17 76.1 kg (167 lb 11.2 oz)              We Performed the Following     MAT FETAL MED CTR REFERRAL-PREGNANCY        Primary Care Provider Fax #    Physician No Ref-Primary [phone]       No address on file        Equal Access to Services     COURTNEY [facility] : Hadii jerald martin Socrystal, waaxda luqadaha, qaybta kaalmalulu calderon . So [facility] [phone].    ATENCIÓN: Si habla español, tiene a burleson disposición servicios gratuitos de asistencia lingüística. Llame al  [phone].    We comply with applicable federal civil rights laws and [place] laws. We do not discriminate on the basis of race, color, national origin, age, disability, sex, sexual orientation, or gender identity.            Thank you!     Thank you for choosing [facility]  for your care. Our goal is always to provide you with excellent care. Hearing back from our patients is one way we can continue to improve our services. Please take a few minutes to complete the written survey that you may receive in the mail after your visit with us. Thank you!             Your Updated Medication List - Protect others around you: Learn how to safely use, store and throw away your medicines at www.disposemymeds.org.          This list is accurate as of 3/13/18  3:32 PM.  Always use your most recent med list.                   Brand Name Dispense Instructions for use Diagnosis    albuterol (5 MG/ML) 0.5% neb solution    PROVENTIL    1 vial    Take 0.5 mLs (2.5 mg) by nebulization every 6 hours as needed for wheezing or shortness of breath / dyspnea    Cough       calcium carbonate 500 MG chewable tablet    TUMS     Take 1 chew tab by mouth daily        [facility]-ASIYA MOUTHWASH Susp     237 mL    Swish and swallow 5-10 mLs in mouth every 6 hours as needed    Stomatitis, viral       fluticasone 110 MCG/ACT Inhaler    FLOVENT HFA    1 Inhaler    Inhale 2 puffs into the lungs 2 times daily    Cough       prenatal multivitamin plus iron 27-0.8 MG Tabs per tablet      Take 1 tablet by mouth daily

## 2018-03-13 NOTE — LETTER
3/13/2018       RE: Adenike Chavez  [address] [place] [address]     Dear Colleague,    Thank you for referring your patient, Adenike Chavez, to the [facility] at [facility]. Please see a copy of my visit note below.    33 yo  at 17+1 with c/s x 2 here for routine OB. 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  Planning on tubal ligation. Discussed salpingectomy.  Declines genetic testing, but does want level 2 ultrasound.    O see flowsheet    A/P IUP at 17+1  Plans repeat c/s at 39 weeks with salpingectomy  Discussed weight gain gola of 25 #.  RTC in 5 weeks  Ordered level 2 sono.    Tyra Richardson MD          Again, thank you for allowing me to participate in the care of your patient.      Sincerely,    Tyra Richardson MD

## 2018-04-03 ENCOUNTER — PRE VISIT (OUTPATIENT)
Dept: MATERNAL FETAL MEDICINE | Facility: CLINIC | Age: 33
End: 2018-04-03

## 2018-04-06 ENCOUNTER — OFFICE VISIT (OUTPATIENT)
Dept: MATERNAL FETAL MEDICINE | Facility: CLINIC | Age: 33
End: 2018-04-06
Attending: OBSTETRICS & GYNECOLOGY
Payer: COMMERCIAL

## 2018-04-06 ENCOUNTER — HOSPITAL ENCOUNTER (OUTPATIENT)
Dept: ULTRASOUND IMAGING | Facility: CLINIC | Age: 33
Discharge: HOME | End: 2018-04-06
Attending: OBSTETRICS & GYNECOLOGY | Admitting: OBSTETRICS & GYNECOLOGY
Payer: COMMERCIAL

## 2018-04-06 DIAGNOSIS — O26.90 PREGNANCY RELATED CONDITION, ANTEPARTUM: ICD-10-CM

## 2018-04-06 DIAGNOSIS — O35.9XX0 SUSPECTED FETAL ANOMALY, ANTEPARTUM, SINGLE OR UNSPECIFIED FETUS: Primary | ICD-10-CM

## 2018-04-06 PROCEDURE — 76811 OB US DETAILED SNGL FETUS: CPT

## 2018-04-06 NOTE — PROGRESS NOTES
Please refer to ultrasound report under 'Imaging' Studies of 'Chart Review' tabs.    Magdaleno Berry M.D.

## 2018-04-06 NOTE — MR AVS SNAPSHOT
After Visit Summary   4/6/2018    Adenike Chavez    MRN: [id number]           Patient Information     Date Of Birth          1985        Visit Information        Provider Department      4/6/2018 10:00 AM Magdaleno Berry MD [facility] Maternal Fetal Medicine [facility]        Today's Diagnoses     Suspected fetal anomaly, antepartum, single or unspecified fetus    -  1       Follow-ups after your visit        Your next 10 appointments already scheduled     Apr 18, 2018  3:30 PM CDT   RETURN OB with Tyra Richardson MD   [facility] ([facility])    [place] [facility] [address]  [address]  [facility] [address]   [phone]            May 04, 2018 10:15 AM CDT   (Arrive by 10:00 AM)   MFM US COMPRE SINGLE F/U with [id number]   [facility] Maternal Fetal Medicine Ultrasound - [place] ([facility])    [address]  [facility] [address] [phone]           Wear comfortable clothes and leave your valuables at home.            May 04, 2018 10:45 AM CDT   Radiology MD with UR JENNI NAM   [facility] Maternal Fetal Medicine - [place] ([facility])    [address]  [facility] [address]   [phone]           Please arrive at the time given for your first appointment. This visit is used internally to schedule the physician's time during your ultrasound.              Future tests that were ordered for you today     Open Future Orders        Priority Expected Expires Ordered    MFM US Comprehensive Single F/U Routine 5/4/2018 4/6/2019 4/6/2018            Who to contact     If you have questions or need follow up information about today's clinic visit or your schedule please contact [facility] MATERNAL FETAL MEDICINE [facility] directly at [phone].  Normal or non-critical lab and imaging results will be communicated to you by MyChart, letter or phone within  4 business days after the clinic has received the results. If you do not hear from us within 7 days, please contact the clinic through [employer] or phone. If you have a critical or abnormal lab result, we will notify you by phone as soon as possible.  Submit refill requests through Off & Away or call your pharmacy and they will forward the refill request to us. Please allow 3 business days for your refill to be completed.          Additional Information About Your Visit        [employer] Information     [employer] Away gives you secure access to your electronic health record. If you see a primary care provider, you can also send messages to your care team and make appointments. If you have questions, please call your primary care clinic.  If you do not have a primary care provider, please call [phone] and they will assist you.        Care EveryWhere ID     This is your Care EveryWhere ID. This could be used by other organizations to access your [place] medical records  [id number]        Your Vitals Were     Last Period                   10/25/2017            Blood Pressure from Last 3 Encounters:   03/13/18 130/83   02/02/18 126/82   12/29/17 132/84    Weight from Last 3 Encounters:   03/13/18 79.7 kg (175 lb 9.6 oz)   02/02/18 76.7 kg (169 lb 3.2 oz)   12/29/17 76.1 kg (167 lb 11.2 oz)               Primary Care Provider Fax #    Physician No Ref-Primary [phone]       No address on file        Equal Access to Services     DUANE SAMPSON : Hadii jerald dennis hadasho Soomaali, waaxda luqadaha, qaybta kaalmada ademariayada, lulu gonzalez . So [facility] [phone].    ATENCIÓN: Si habla español, tiene a burleson disposición servicios gratuitos de asistencia lingüística. Llame al [phone].    We comply with applicable federal civil rights laws and [place] laws. We do not discriminate on the basis of race, color, national origin, age, disability, sex, sexual orientation, or gender identity.            Thank  you!     Thank you for choosing [facility]  for your care. Our goal is always to provide you with excellent care. Hearing back from our patients is one way we can continue to improve our services. Please take a few minutes to complete the written survey that you may receive in the mail after your visit with us. Thank you!             Your Updated Medication List - Protect others around you: Learn how to safely use, store and throw away your medicines at www.disposemymeds.org.          This list is accurate as of 4/6/18 10:27 AM.  Always use your most recent med list.                   Brand Name Dispense Instructions for use Diagnosis    albuterol (5 MG/ML) 0.5% neb solution    PROVENTIL    1 vial    Take 0.5 mLs (2.5 mg) by nebulization every 6 hours as needed for wheezing or shortness of breath / dyspnea    Cough       calcium carbonate 500 MG chewable tablet    TUMS     Take 1 chew tab by mouth daily        FIRST-ASIYA MOUTHWASH Susp     237 mL    Swish and swallow 5-10 mLs in mouth every 6 hours as needed    Stomatitis, viral       fluticasone 110 MCG/ACT Inhaler    FLOVENT HFA    1 Inhaler    Inhale 2 puffs into the lungs 2 times daily    Cough       prenatal multivitamin plus iron 27-0.8 MG Tabs per tablet      Take 1 tablet by mouth daily

## 2018-04-18 ENCOUNTER — OFFICE VISIT (OUTPATIENT)
Dept: OBGYN | Facility: CLINIC | Age: 33
End: 2018-04-18
Attending: OBSTETRICS & GYNECOLOGY
Payer: COMMERCIAL

## 2018-04-18 VITALS
HEART RATE: 90 BPM | BODY MASS INDEX: 35.06 KG/M2 | WEIGHT: 179.5 LBS | SYSTOLIC BLOOD PRESSURE: 110 MMHG | DIASTOLIC BLOOD PRESSURE: 77 MMHG

## 2018-04-18 DIAGNOSIS — Z34.82 PRENATAL CARE, SUBSEQUENT PREGNANCY, SECOND TRIMESTER: Primary | ICD-10-CM

## 2018-04-18 PROCEDURE — G0463 HOSPITAL OUTPT CLINIC VISIT: HCPCS | Mod: ZF

## 2018-04-18 NOTE — NURSING NOTE
Chief Complaint   Patient presents with     Prenatal Care     22w2d       See GEORGE Chavez 4/18/2018

## 2018-04-18 NOTE — LETTER
Date:April 26, 2018      Patient was self referred, no letter generated. Do not send.        [facility] Health Information

## 2018-04-18 NOTE — LETTER
2018       RE: Adenike Chavez  [address] [place] [address]     Dear Colleague,    Thank you for referring your patient, Adenike Chavez, to the [facility] at [facility]. Please see a copy of my visit note below.    31 yo  at 22 +2.  Good FM. Still planning slapingectomy.    See flow sheet    A/P IUP at 22+2  Plans repeat c/s  RTC for GCVT and EOB with CNM.    Tyra Richardson MD      Again, thank you for allowing me to participate in the care of your patient.      Sincerely,    Tyra Richardson MD

## 2018-04-18 NOTE — MR AVS SNAPSHOT
After Visit Summary   4/18/2018    Adenike Chavez    MRN: [id number]           Patient Information     Date Of Birth          1985        Visit Information        Provider Department      4/18/2018 3:30 PM Tyra Richardson MD [facility]        Today's Diagnoses     Prenatal care, subsequent pregnancy, second trimester    -  1       Follow-ups after your visit        Follow-up notes from your care team     Return in about 4 weeks (around 5/16/2018).      Your next 10 appointments already scheduled     May 04, 2018 10:15 AM CDT   (Arrive by 10:00 AM)   PILO US COMPRE SINGLE F/U with [id number]   [facility] - [place] ([facility])    [address]  [facility] [id number] [phone]           Wear comfortable clothes and leave your valuables at home.            May 04, 2018 10:45 AM CDT   Radiology MD with UR JENNI NAM   [facility] - [facility])    [address]  [facility] [address] [phone]           Please arrive at the time given for your first appointment. This visit is used internally to schedule the physician's time during your ultrasound.            Jun 01, 2018  9:00 AM CDT   Return Obstetrics Extended with MAGDA Madsen CNM   [facility] ([facility])    [place] [facility] [address]  [address]  [address]  [facility] [id number] [phone]              Who to contact     Please call your clinic at [phone] to:    Ask questions about your health    Make or cancel appointments    Discuss your medicines    Learn about your test results    Speak to your doctor            Additional Information About Your Visit        MyChart Information     MyChart gives you secure access to your electronic health record. If you see a primary care provider, you can also  send messages to your care team and make appointments. If you have questions, please call your primary care clinic.  If you do not have a primary care provider, please call [phone] and they will assist you.      [employer] is an electronic gateway that provides easy, online access to your medical records. With [employer], you can request a clinic appointment, read your test results, renew a prescription or communicate with your care team.     To access your existing account, please contact your [facility] or call [phone] for assistance.        Care EveryWhere ID     This is your Care EveryWhere ID. This could be used by other organizations to access your [place] medical records  [id number]        Your Vitals Were     Pulse Last Period BMI (Body Mass Index)             90 10/25/2017 35.06 kg/m2          Blood Pressure from Last 3 Encounters:   No data found for BP    Weight from Last 3 Encounters:   No data found for Wt              Today, you had the following     No orders found for display       Primary Care Provider Fax #    Physician No Ref-Primary [phone]       No address on file        Equal Access to Services     DUANE SAMPSON : Hadii jerald dennis hadasho Soomaali, waaxda luqadaha, qaybta kaalmada adeegyapinky, lulu gonzalez . So [facility] [phone].    ATENCIÓN: Si habla español, tiene a burleson disposición servicios gratuitos de asistencia lingüística. Llame al [phone].    We comply with applicable federal civil rights laws and [place] laws. We do not discriminate on the basis of race, color, national origin, age, disability, sex, sexual orientation, or gender identity.            Thank you!     Thank you for choosing [facility]  for your care. Our goal is always to provide you with excellent care. Hearing back from our patients is one way we can continue to improve our services. Please take a few minutes to complete the  written survey that you may receive in the mail after your visit with us. Thank you!             Your Updated Medication List - Protect others around you: Learn how to safely use, store and throw away your medicines at www.disposemymeds.org.          This list is accurate as of 4/18/18 11:59 PM.  Always use your most recent med list.                   Brand Name Dispense Instructions for use Diagnosis    albuterol (5 MG/ML) 0.5% neb solution    PROVENTIL    1 vial    Take 0.5 mLs (2.5 mg) by nebulization every 6 hours as needed for wheezing or shortness of breath / dyspnea    Cough       calcium carbonate 500 MG chewable tablet    TUMS     Take 1 chew tab by mouth daily        FIRST-ASIYA MOUTHWASH Susp     237 mL    Swish and swallow 5-10 mLs in mouth every 6 hours as needed    Stomatitis, viral       fluticasone 110 MCG/ACT Inhaler    FLOVENT HFA    1 Inhaler    Inhale 2 puffs into the lungs 2 times daily    Cough       prenatal multivitamin plus iron 27-0.8 MG Tabs per tablet      Take 1 tablet by mouth daily

## 2018-04-18 NOTE — PROGRESS NOTES
33 yo  at 22 +2.  Good FM. Still planning slapingectomy.    See flow sheet    A/P IUP at 22+2  Plans repeat c/s  RTC for GCVT and EOB with CNM.    Tyra Richardson MD

## 2018-05-04 ENCOUNTER — OFFICE VISIT (OUTPATIENT)
Dept: MATERNAL FETAL MEDICINE | Facility: CLINIC | Age: 33
End: 2018-05-04
Attending: OBSTETRICS & GYNECOLOGY
Payer: COMMERCIAL

## 2018-05-04 ENCOUNTER — HOSPITAL ENCOUNTER (OUTPATIENT)
Dept: ULTRASOUND IMAGING | Facility: CLINIC | Age: 33
Discharge: HOME | End: 2018-05-04
Attending: OBSTETRICS & GYNECOLOGY | Admitting: OBSTETRICS & GYNECOLOGY
Payer: COMMERCIAL

## 2018-05-04 DIAGNOSIS — Z03.73 SUSPECTED FETAL ANOMALY NOT FOUND: Primary | ICD-10-CM

## 2018-05-04 DIAGNOSIS — O35.9XX0 SUSPECTED FETAL ANOMALY, ANTEPARTUM, SINGLE OR UNSPECIFIED FETUS: ICD-10-CM

## 2018-05-04 PROCEDURE — 76816 OB US FOLLOW-UP PER FETUS: CPT

## 2018-05-04 NOTE — PROGRESS NOTES
"Please see \"Imaging\" tab under \"Chart Review\" for details of today's US.      Marilee Mijares, DO  Maternal-Fetal Medicine        "

## 2018-05-04 NOTE — MR AVS SNAPSHOT
After Visit Summary   5/4/2018    Adenike Chavez    MRN: [id number]           Patient Information     Date Of Birth          1985        Visit Information        Provider Department      5/4/2018 10:45 AM Marilee Mijares, DO [facility] Maternal Fetal Medicine [facility]        Today's Diagnoses     Suspected fetal anomaly not found    -  1       Follow-ups after your visit        Your next 10 appointments already scheduled     Jun 01, 2018  9:00 AM CDT   Return Obstetrics Extended with MAGDA Madsen CNM   [facility] ([facility])    [place] [facility] [address]  [address]  [address]  [facility] [address] [phone]              Who to contact     If you have questions or need follow up information about today's clinic visit or your schedule please contact [facility]IC[facility] directly at [phone].  Normal or non-critical lab and imaging results will be communicated to you by Art[employer]comhart, letter or phone within 4 business days after the clinic has received the results. If you do not hear from us within 7 days, please contact the clinic through Art[employer]comhart or phone. If you have a critical or abnormal lab result, we will notify you by phone as soon as possible.  Submit refill requests through [employer] or call your pharmacy and they will forward the refill request to us. Please allow 3 business days for your refill to be completed.          Additional Information About Your Visit        MyChart Information     [employer] gives you secure access to your electronic health record. If you see a primary care provider, you can also send messages to your care team and make appointments. If you have questions, please call your primary care clinic.  If you do not have a primary care provider, please call [phone] and they will assist you.        Care EveryWhere ID     This is your Care EveryWhere ID. This could be used by other  organizations to access your [place] medical records  [id number]        Your Vitals Were     Last Period                   10/25/2017            Blood Pressure from Last 3 Encounters:   04/18/18 110/77   03/13/18 130/83   02/02/18 126/82    Weight from Last 3 Encounters:   04/18/18 81.4 kg (179 lb 8 oz)   03/13/18 79.7 kg (175 lb 9.6 oz)   02/02/18 76.7 kg (169 lb 3.2 oz)              Today, you had the following     No orders found for display       Primary Care Provider Fax #    Physician No Ref-Primary [phone]       No address on file        Equal Access to Services     [facility] : Hadii jerald Marc, efrain shabazz, micaela tenorio, lulu gonzalez . So [facility] [phone].    ATENCIÓN: Si habla español, tiene a burleson disposición servicios gratuitos de asistencia lingüística. Llame al [phone].    We comply with applicable federal civil rights laws and [place] laws. We do not discriminate on the basis of race, color, national origin, age, disability, sex, sexual orientation, or gender identity.            Thank you!     Thank you for choosing [facility]  for your care. Our goal is always to provide you with excellent care. Hearing back from our patients is one way we can continue to improve our services. Please take a few minutes to complete the written survey that you may receive in the mail after your visit with us. Thank you!             Your Updated Medication List - Protect others around you: Learn how to safely use, store and throw away your medicines at www.disposemymeds.org.          This list is accurate as of 5/4/18 11:09 AM.  Always use your most recent med list.                   Brand Name Dispense Instructions for use Diagnosis    albuterol (5 MG/ML) 0.5% neb solution    PROVENTIL    1 vial    Take 0.5 mLs (2.5 mg) by nebulization every 6 hours as needed for wheezing or shortness of breath / dyspnea    Cough        calcium carbonate 500 MG chewable tablet    TUMS     Take 1 chew tab by mouth daily        FIRST-ASIYA MOUTHWASH Susp     237 mL    Swish and swallow 5-10 mLs in mouth every 6 hours as needed    Stomatitis, viral       fluticasone 110 MCG/ACT Inhaler    FLOVENT HFA    1 Inhaler    Inhale 2 puffs into the lungs 2 times daily    Cough       prenatal multivitamin plus iron 27-0.8 MG Tabs per tablet      Take 1 tablet by mouth daily

## 2018-06-01 ENCOUNTER — OFFICE VISIT (OUTPATIENT)
Dept: OBGYN | Facility: CLINIC | Age: 33
End: 2018-06-01
Attending: ADVANCED PRACTICE MIDWIFE
Payer: COMMERCIAL

## 2018-06-01 VITALS
SYSTOLIC BLOOD PRESSURE: 121 MMHG | BODY MASS INDEX: 35.93 KG/M2 | HEIGHT: 60 IN | HEART RATE: 89 BPM | WEIGHT: 183 LBS | DIASTOLIC BLOOD PRESSURE: 83 MMHG

## 2018-06-01 DIAGNOSIS — Z23 NEED FOR TDAP VACCINATION: ICD-10-CM

## 2018-06-01 DIAGNOSIS — O09.93 HRP (HIGH RISK PREGNANCY), THIRD TRIMESTER: Primary | ICD-10-CM

## 2018-06-01 LAB
ERYTHROCYTE [DISTWIDTH] IN BLOOD BY AUTOMATED COUNT: 12.6 % (ref 10–15)
GLUCOSE 1H P 50 G GLC PO SERPL-MCNC: 151 MG/DL (ref 60–129)
HCT VFR BLD AUTO: 35 % (ref 35–47)
HGB BLD-MCNC: 11.5 G/DL (ref 11.7–15.7)
MCH RBC QN AUTO: 28.8 PG (ref 26.5–33)
MCHC RBC AUTO-ENTMCNC: 32.9 G/DL (ref 31.5–36.5)
MCV RBC AUTO: 88 FL (ref 78–100)
PLATELET # BLD AUTO: 231 10E9/L (ref 150–450)
RBC # BLD AUTO: 3.99 10E12/L (ref 3.8–5.2)
T PALLIDUM AB SER QL: NONREACTIVE
WBC # BLD AUTO: 9.3 10E9/L (ref 4–11)

## 2018-06-01 PROCEDURE — 82950 GLUCOSE TEST: CPT | Performed by: ADVANCED PRACTICE MIDWIFE

## 2018-06-01 PROCEDURE — 86780 TREPONEMA PALLIDUM: CPT | Performed by: ADVANCED PRACTICE MIDWIFE

## 2018-06-01 PROCEDURE — 36415 COLL VENOUS BLD VENIPUNCTURE: CPT | Performed by: ADVANCED PRACTICE MIDWIFE

## 2018-06-01 PROCEDURE — 90715 TDAP VACCINE 7 YRS/> IM: CPT | Mod: ZF

## 2018-06-01 PROCEDURE — G0463 HOSPITAL OUTPT CLINIC VISIT: HCPCS | Mod: ZF

## 2018-06-01 PROCEDURE — 25000128 H RX IP 250 OP 636: Mod: ZF

## 2018-06-01 PROCEDURE — 85027 COMPLETE CBC AUTOMATED: CPT | Performed by: ADVANCED PRACTICE MIDWIFE

## 2018-06-01 PROCEDURE — 82306 VITAMIN D 25 HYDROXY: CPT | Performed by: ADVANCED PRACTICE MIDWIFE

## 2018-06-01 PROCEDURE — 90471 IMMUNIZATION ADMIN: CPT | Mod: ZF

## 2018-06-01 ASSESSMENT — ANXIETY QUESTIONNAIRES
5. BEING SO RESTLESS THAT IT IS HARD TO SIT STILL: NOT AT ALL
7. FEELING AFRAID AS IF SOMETHING AWFUL MIGHT HAPPEN: NOT AT ALL
6. BECOMING EASILY ANNOYED OR IRRITABLE: NOT AT ALL
3. WORRYING TOO MUCH ABOUT DIFFERENT THINGS: NOT AT ALL
2. NOT BEING ABLE TO STOP OR CONTROL WORRYING: NOT AT ALL
1. FEELING NERVOUS, ANXIOUS, OR ON EDGE: NOT AT ALL
GAD7 TOTAL SCORE: 0

## 2018-06-01 ASSESSMENT — PATIENT HEALTH QUESTIONNAIRE - PHQ9: 5. POOR APPETITE OR OVEREATING: NOT AT ALL

## 2018-06-01 NOTE — PROGRESS NOTES
32 year old, , 28w4d,   The patient presents with the following concerns: Sciatic discomfort bilaterally, discussed comfort measures.    PHQ-9 SCORE 2014 2014 10/28/2014   Total Score 1 0 0     Education completed today includes breast feeding, [facility] hand out , contraception, counting movements, signs of pre-term labor, when to present to birthplace, post partum depression, GBS and getting enough iron.   [place] Feeding plans :    Contraception planned:  tubal ligation  The following labs were ordered today:   GCT, CBC w platelets, Vitamin D Anti-treponema,      Blood type:   ABO   Date Value Ref Range Status   2017 B  Final     RH(D)   Date Value Ref Range Status   2017 Pos  Final     Antibody Screen   Date Value Ref Range Status   2017 Neg  Final     TDAP  wasgiven.  A/P:  Encounter Diagnoses   Name Primary?     Need for Tdap vaccination      HRP (high risk pregnancy), third trimester Yes     Orders Placed This Encounter   Procedures     TDAP VACCINE (BOOSTRIX)     25- OH-Vitamin D     Glucose 1 Hour     Treponema Abs w Reflex to RPR and Titer     CBC with platelets     Orders Placed This Encounter   Medications     RaNITidine HCl (ZANTAC PO)     Continue scheduled prenatal care  MAGDA Madsen CNM

## 2018-06-01 NOTE — MR AVS SNAPSHOT
After Visit Summary   6/1/2018    Adenike Chavez    MRN: [id number]           Patient Information     Date Of Birth          1985        Visit Information        Provider Department      6/1/2018 9:00 AM Bridget Maldonado APRN CNM [facility]        Today's Diagnoses     HRP (high risk pregnancy), third trimester    -  1    Need for Tdap vaccination           Follow-ups after your visit        Follow-up notes from your care team     Return in about 4 weeks (around 6/29/2018) for THALIA.      Your next 10 appointments already scheduled     Jun 27, 2018  3:30 PM CDT   RETURN OB with Tyra Richardson MD   [facility] ([facility])    [place] [facility] [address]  [address]  [address]  [facility] [address] [phone]              Who to contact     Please call your clinic at [phone] to:    Ask questions about your health    Make or cancel appointments    Discuss your medicines    Learn about your test results    Speak to your doctor            Additional Information About Your Visit        MyChart Information     [employer] gives you secure access to your electronic health record. If you see a primary care provider, you can also send messages to your care team and make appointments. If you have questions, please call your primary care clinic.  If you do not have a primary care provider, please call [phone] and they will assist you.      [employer] is an electronic gateway that provides easy, online access to your medical records. With [employer], you can request a clinic appointment, read your test results, renew a prescription or communicate with your care team.     To access your existing account, please contact your [facility] or call [phone] for assistance.        Care EveryWhere ID     This is your Care EveryWhere ID. This could be used by other organizations to access your [place]  medical records  [id number]        Your Vitals Were     Pulse Height Last Period BMI (Body Mass Index)          89 1.524 m (5') 10/25/2017 35.74 kg/m2         Blood Pressure from Last 3 Encounters:   06/01/18 121/83   04/18/18 110/77   03/13/18 130/83    Weight from Last 3 Encounters:   06/01/18 83 kg (183 lb)   04/18/18 81.4 kg (179 lb 8 oz)   03/13/18 79.7 kg (175 lb 9.6 oz)              We Performed the Following     25- OH-Vitamin D     CBC with platelets     Glucose 1 Hour     TDAP VACCINE (BOOSTRIX)     Treponema Abs w Reflex to RPR and Titer        Primary Care Provider Fax #    Physician No Ref-Primary [phone]       No address on file        Equal Access to Services     DUANE SAMPSON : Irwin Marc, efrain shabazz, micaela moraalmartha tenorio, lulu gonzalez . So [facility] [phone].    ATENCIÓN: Si habla español, tiene a burleson disposición servicios gratuitos de asistencia lingüística. Llame al [phone].    We comply with applicable federal civil rights laws and [place] laws. We do not discriminate on the basis of race, color, national origin, age, disability, sex, sexual orientation, or gender identity.            Thank you!     Thank you for choosing [facility]  for your care. Our goal is always to provide you with excellent care. Hearing back from our patients is one way we can continue to improve our services. Please take a few minutes to complete the written survey that you may receive in the mail after your visit with us. Thank you!             Your Updated Medication List - Protect others around you: Learn how to safely use, store and throw away your medicines at www.disposemymeds.org.          This list is accurate as of 6/1/18  1:49 PM.  Always use your most recent med list.                   Brand Name Dispense Instructions for use Diagnosis    albuterol (5 MG/ML) 0.5% neb solution    PROVENTIL    1 vial    Take 0.5 mLs (2.5 mg)  by nebulization every 6 hours as needed for wheezing or shortness of breath / dyspnea    Cough       calcium carbonate 500 MG chewable tablet    TUMS     Take 1 chew tab by mouth daily        FIRST-ASIYA MOUTHWASH Susp     237 mL    Swish and swallow 5-10 mLs in mouth every 6 hours as needed    Stomatitis, viral       fluticasone 110 MCG/ACT Inhaler    FLOVENT HFA    1 Inhaler    Inhale 2 puffs into the lungs 2 times daily    Cough       prenatal multivitamin plus iron 27-0.8 MG Tabs per tablet      Take 1 tablet by mouth daily        ZANTAC PO

## 2018-06-01 NOTE — LETTER
2018       RE: Adenike Chavez  [address] [place] [address]     Dear Colleague,    Thank you for referring your patient, Adenike Chavez, to the [facility] at [facility]. Please see a copy of my visit note below.     32 year old, , 28w4d,   The patient presents with the following concerns: Sciatic discomfort bilaterally, discussed comfort measures.    PHQ-9 SCORE 2014 2014 10/28/2014   Total Score 1 0 0     Education completed today includes breast feeding, [facility] hand out , contraception, counting movements, signs of pre-term labor, when to present to birthplace, post partum depression, GBS and getting enough iron.   [place] Feeding plans :    Contraception planned:  tubal ligation  The following labs were ordered today:   GCT, CBC w platelets, Vitamin D Anti-treponema,      Blood type:   ABO   Date Value Ref Range Status   2017 B  Final     RH(D)   Date Value Ref Range Status   2017 Pos  Final     Antibody Screen   Date Value Ref Range Status   2017 Neg  Final     TDAP  wasgiven.  A/P:  Encounter Diagnoses   Name Primary?     Need for Tdap vaccination      HRP (high risk pregnancy), third trimester Yes     Orders Placed This Encounter   Procedures     TDAP VACCINE (BOOSTRIX)     25- OH-Vitamin D     Glucose 1 Hour     Treponema Abs w Reflex to RPR and Titer     CBC with platelets     Orders Placed This Encounter   Medications     RaNITidine HCl (ZANTAC PO)     Continue scheduled prenatal care  MAGDA Madsen CNM              Again, thank you for allowing me to participate in the care of your patient.      Sincerely,    MAGDA Madsen CNM

## 2018-06-01 NOTE — LETTER
Date:June 5, 2018      Patient was self referred, no letter generated. Do not send.        [facility] Health Information

## 2018-06-02 ASSESSMENT — PATIENT HEALTH QUESTIONNAIRE - PHQ9: SUM OF ALL RESPONSES TO PHQ QUESTIONS 1-9: 1

## 2018-06-02 ASSESSMENT — ANXIETY QUESTIONNAIRES: GAD7 TOTAL SCORE: 0

## 2018-06-04 ENCOUNTER — TELEPHONE (OUTPATIENT)
Dept: OBGYN | Facility: CLINIC | Age: 33
End: 2018-06-04

## 2018-06-04 DIAGNOSIS — O99.810 ABNORMAL MATERNAL GLUCOSE TOLERANCE, ANTEPARTUM: Primary | ICD-10-CM

## 2018-06-04 LAB — DEPRECATED CALCIDIOL+CALCIFEROL SERPL-MC: 28 UG/L (ref 20–75)

## 2018-06-04 NOTE — TELEPHONE ENCOUNTER
Pt called to discuss elevated 1 hr. GCT. Patient is aware of need for 3 hour, [place] [facility] Educated pt on need to be fasting for this test. Pt expressed understanding and agrees with plan. Entered GTT order, pt [place] [facility] [place]

## 2018-06-07 DIAGNOSIS — O99.810 ABNORMAL MATERNAL GLUCOSE TOLERANCE, ANTEPARTUM: ICD-10-CM

## 2018-06-07 LAB
GLUCOSE 1H P 100 G GLC PO SERPL-MCNC: 189 MG/DL (ref 60–179)
GLUCOSE 2H P 100 G GLC PO SERPL-MCNC: 117 MG/DL (ref 60–154)
GLUCOSE 3H P 100 G GLC PO SERPL-MCNC: 99 MG/DL (ref 60–139)
GLUCOSE P FAST SERPL-MCNC: 83 MG/DL (ref 60–94)

## 2018-06-27 ENCOUNTER — OFFICE VISIT (OUTPATIENT)
Dept: OBGYN | Facility: CLINIC | Age: 33
End: 2018-06-27
Attending: PEDIATRICS
Payer: COMMERCIAL

## 2018-06-27 VITALS
DIASTOLIC BLOOD PRESSURE: 78 MMHG | SYSTOLIC BLOOD PRESSURE: 112 MMHG | WEIGHT: 185.2 LBS | HEIGHT: 60 IN | BODY MASS INDEX: 36.36 KG/M2 | HEART RATE: 88 BPM

## 2018-06-27 DIAGNOSIS — O34.219 PREVIOUS CESAREAN DELIVERY, ANTEPARTUM CONDITION OR COMPLICATION: Primary | ICD-10-CM

## 2018-06-27 PROCEDURE — G0463 HOSPITAL OUTPT CLINIC VISIT: HCPCS | Mod: ZF

## 2018-06-27 RX ORDER — SODIUM CHLORIDE, SODIUM LACTATE, POTASSIUM CHLORIDE, CALCIUM CHLORIDE 600; 310; 30; 20 MG/100ML; MG/100ML; MG/100ML; MG/100ML
INJECTION, SOLUTION INTRAVENOUS CONTINUOUS
Status: CANCELLED | OUTPATIENT
Start: 2018-06-27

## 2018-06-27 RX ORDER — CITRIC ACID/SODIUM CITRATE 334-500MG
30 SOLUTION, ORAL ORAL
Status: CANCELLED | OUTPATIENT
Start: 2018-06-27

## 2018-06-27 RX ORDER — CEFAZOLIN SODIUM 1 G/3ML
1 INJECTION, POWDER, FOR SOLUTION INTRAMUSCULAR; INTRAVENOUS SEE ADMIN INSTRUCTIONS
Status: CANCELLED | OUTPATIENT
Start: 2018-06-27

## 2018-06-27 NOTE — LETTER
2018       RE: Adenike Chavez  [address] [place] [address]     Dear Colleague,    Thank you for referring your patient, Adenike Chavez, to the [facility] at [facility]. Please see a copy of my visit note below.    33 yo  at 32 weeks her for THALIA, failed CT, passed GTT. REquests scheduling of c/s and salpingectomy.  O see flow sheet  A?P Up at 32   Plans repeat c/s and salpingectomy -scheduling form completed.  S/p Tdap  RTC in 3 weeks    Tyra Richardson MD

## 2018-06-27 NOTE — NURSING NOTE
Chief Complaint   Patient presents with     Prenatal Care     32w2d       See GEORGE Chavez 6/27/2018

## 2018-06-27 NOTE — MR AVS SNAPSHOT
After Visit Summary   2018    Adenike Chavez    MRN: [id number]           Patient Information     Date Of Birth          1985        Visit Information        Provider Department      2018 8:45 AM Tyra Richardson MD [facility]        Today's Diagnoses     Previous  delivery, antepartum condition or complication    -  1       Follow-ups after your visit        Follow-up notes from your care team     Return in about 3 weeks (around 2018).      Who to contact     Please call your clinic at [phone] to:    Ask questions about your health    Make or cancel appointments    Discuss your medicines    Learn about your test results    Speak to your doctor            Additional Information About Your Visit        [facility] Information     [employer] gives you secure access to your electronic health record. If you see a primary care provider, you can also send messages to your care team and make appointments. If you have questions, please call your primary care clinic.  If you do not have a primary care provider, please call [phone] and they will assist you.      [employer] is an electronic gateway that provides easy, online access to your medical records. With [employer], you can request a clinic appointment, read your test results, renew a prescription or communicate with your care team.     To access your existing account, please contact your [facility] or call [phone] for assistance.        Care EveryWhere ID     This is your Care EveryWhere ID. This could be used by other organizations to access your [place] medical records  [id number]        Your Vitals Were     Pulse Height Last Period Breastfeeding? BMI (Body Mass Index)       88 1.524 m (5') 10/25/2017 No 36.17 kg/m2        Blood Pressure from Last 3 Encounters:   18 112/78   18 121/83   18 110/77    Weight from Last 3 Encounters:   18 84 kg  (185 lb 3.2 oz)   18 83 kg (183 lb)   18 81.4 kg (179 lb 8 oz)              We Performed the Following     Nereyda-Operative Worksheet (Repeat  Section with Bilateral Tubal Ligation)        Primary Care Provider Fax #    Physician No Ref-Primary [phone]       No address on file        Equal Access to Services     BLACKCOURTNEY CAMILLA : Hadii aad ku hadelviso Soomaali, waaxda luqadaha, qaybta kaalmada ademariaprabhjotda, lulu flood brendenjuanita carlislepipoxenia gonzalze . So [facility] [phone].    ATENCIÓN: Si habla español, tiene a burleson disposición servicios gratuitos de asistencia lingüística. Llame al [phone].    We comply with applicable federal civil rights laws and [place] laws. We do not discriminate on the basis of race, color, national origin, age, disability, sex, sexual orientation, or gender identity.            Thank you!     Thank you for choosing [facility]  for your care. Our goal is always to provide you with excellent care. Hearing back from our patients is one way we can continue to improve our services. Please take a few minutes to complete the written survey that you may receive in the mail after your visit with us. Thank you!             Your Updated Medication List - Protect others around you: Learn how to safely use, store and throw away your medicines at www.disposemymeds.org.          This list is accurate as of 18  9:41 AM.  Always use your most recent med list.                   Brand Name Dispense Instructions for use Diagnosis    albuterol (5 MG/ML) 0.5% neb solution    PROVENTIL    1 vial    Take 0.5 mLs (2.5 mg) by nebulization every 6 hours as needed for wheezing or shortness of breath / dyspnea    Cough       calcium carbonate 500 MG chewable tablet    TUMS     Take 1 chew tab by mouth daily        FIRST-ASIYA MOUTHWASH Susp     237 mL    Swish and swallow 5-10 mLs in mouth every 6 hours as needed    Stomatitis, viral       fluticasone 110 MCG/ACT Inhaler     FLOVENT HFA    1 Inhaler    Inhale 2 puffs into the lungs 2 times daily    Cough       prenatal multivitamin plus iron 27-0.8 MG Tabs per tablet      Take 1 tablet by mouth daily        ZANTAC PO

## 2018-06-27 NOTE — PROGRESS NOTES
33 yo  at 32 weeks her for THALIA, failed CT, passed GTT. REquests scheduling of c/s and salpingectomy.  O see flow sheet  A?P Up at 32   Plans repeat c/s and salpingectomy -scheduling form completed.  S/p Tdap  RTC in 3 weeks    Tyra Richardson MD

## 2018-07-23 ENCOUNTER — OFFICE VISIT (OUTPATIENT)
Dept: OBGYN | Facility: CLINIC | Age: 33
End: 2018-07-23
Attending: OBSTETRICS & GYNECOLOGY
Payer: COMMERCIAL

## 2018-07-23 VITALS
BODY MASS INDEX: 37.21 KG/M2 | SYSTOLIC BLOOD PRESSURE: 109 MMHG | HEIGHT: 60 IN | HEART RATE: 99 BPM | DIASTOLIC BLOOD PRESSURE: 74 MMHG | WEIGHT: 189.5 LBS

## 2018-07-23 DIAGNOSIS — O09.93 SUPERVISION OF HIGH RISK PREGNANCY IN THIRD TRIMESTER: Primary | ICD-10-CM

## 2018-07-23 PROCEDURE — G0463 HOSPITAL OUTPT CLINIC VISIT: HCPCS | Mod: ZF

## 2018-07-23 PROCEDURE — 87653 STREP B DNA AMP PROBE: CPT | Performed by: OBSTETRICS & GYNECOLOGY

## 2018-07-23 ASSESSMENT — PATIENT HEALTH QUESTIONNAIRE - PHQ9: 5. POOR APPETITE OR OVEREATING: NOT AT ALL

## 2018-07-23 NOTE — LETTER
Date:July 24, 2018      Patient was self referred, no letter generated. Do not send.        [facility] Health Information

## 2018-07-23 NOTE — NURSING NOTE
Chief Complaint   Patient presents with     Prenatal Care     35w7d       See GEORGE Chavez 7/23/2018

## 2018-07-23 NOTE — PROGRESS NOTES
SUBJECTIVE   Adenike Chavez is a 32 year old  at 36w0d by 6 week US, here for return ob visit.  She denies ctx/LOF/vb/preE sx. +FM  Concerns today are: none.     OBJECTIVE   /74 (BP Location: Right arm, Patient Position: Chair)  Pulse 99  Ht 1.524 m (5')  Wt 86 kg (189 lb 8 oz)  LMP 10/25/2017  Breastfeeding? No  BMI 37.01 kg/m2    See Ob Flowsheet    ASSESSMENT & PLAN   32 year old  at 36w0d by 6 week US, THALIA.    1. PNC: routine ob labs reviewed   Rh pos, Rubella immune   GBS done today   Failed 1hr GTT, passed 3 hr  2. Hx CD x 2 - scheduled for ERCD with BTL at 39 weeks    RTC 1 week    Darby Harkins MD  2018

## 2018-07-23 NOTE — MR AVS SNAPSHOT
After Visit Summary   7/23/2018    Adenike Chavez    MRN: [id number]           Patient Information     Date Of Birth          1985        Visit Information        Provider Department      7/23/2018 8:45 AM Darby Harkins MD [facility]        Today's Diagnoses     Supervision of high risk pregnancy in third trimester    -  1       Follow-ups after your visit        Your next 10 appointments already scheduled     Aug 01, 2018  3:30 PM CDT   RETURN OB with Darby Harkins MD   [facility] ([facility])    [facility] [address]  [facility] [id number] [phone]            Aug 07, 2018 11:30 AM CDT   RETURN OB with Darby Harkins MD   [facility] [place] [facility]    [facility] [address]  [address]  [facility] [address] [phone]            Aug 13, 2018   Procedure with Darby Harkins MD   [facility] (--)    [address]  [facility] [address] [phone]              Who to contact     Please call your clinic at [phone] to:    Ask questions about your health    Make or cancel appointments    Discuss your medicines    Learn about your test results    Speak to your doctor            Additional Information About Your Visit        MyChart Information     [employer] gives you secure access to your electronic health record. If you see a primary care provider, you can also send messages to your care team and make appointments. If you have questions, please call your primary care clinic.  If you do not have a primary care provider, please call [phone] and they will assist you.      [address] is an electronic gateway that provides easy, online access to your medical records. With [address], you can request a clinic appointment, read your test results, renew a prescription or communicate with your care team.     To access your  existing account, please contact your [facility] or call [phone] for assistance.        Care EveryWhere ID     This is your Care EveryWhere ID. This could be used by other organizations to access your [place] medical records  [id number]        Your Vitals Were     Pulse Height Last Period Breastfeeding? BMI (Body Mass Index)       99 1.524 m (5') 10/25/2017 No 37.01 kg/m2        Blood Pressure from Last 3 Encounters:   07/23/18 109/74   06/27/18 112/78   06/01/18 121/83    Weight from Last 3 Encounters:   07/23/18 86 kg (189 lb 8 oz)   06/27/18 84 kg (185 lb 3.2 oz)   06/01/18 83 kg (183 lb)              We Performed the Following     Group B strep PCR        Primary Care Provider Fax #    Physician No Ref-Primary [phone]       No address on file        Equal Access to Services     [facility] : Hadii jerald Marc, wachema shabazz, juliethta kaalmapinky tenorio, lulu gonzalez . So [facility] [phone].    ATENCIÓN: Si habla español, tiene a burleson disposición servicios gratuitos de asistencia lingüística. Llame al [phone].    We comply with applicable federal civil rights laws and [place] laws. We do not discriminate on the basis of race, color, national origin, age, disability, sex, sexual orientation, or gender identity.            Thank you!     Thank you for choosing [facility]  for your care. Our goal is always to provide you with excellent care. Hearing back from our patients is one way we can continue to improve our services. Please take a few minutes to complete the written survey that you may receive in the mail after your visit with us. Thank you!             Your Updated Medication List - Protect others around you: Learn how to safely use, store and throw away your medicines at www.disposemymeds.org.          This list is accurate as of 7/23/18  3:25 PM.  Always use your most recent med list.                    Brand Name Dispense Instructions for use Diagnosis    albuterol (5 MG/ML) 0.5% neb solution    PROVENTIL    1 vial    Take 0.5 mLs (2.5 mg) by nebulization every 6 hours as needed for wheezing or shortness of breath / dyspnea    Cough       calcium carbonate 500 MG chewable tablet    TUMS     Take 1 chew tab by mouth daily        FIRST-ASIYA MOUTHWASH Susp     237 mL    Swish and swallow 5-10 mLs in mouth every 6 hours as needed    Stomatitis, viral       fluticasone 110 MCG/ACT Inhaler    FLOVENT HFA    1 Inhaler    Inhale 2 puffs into the lungs 2 times daily    Cough       prenatal multivitamin plus iron 27-0.8 MG Tabs per tablet      Take 1 tablet by mouth daily        ZANTAC PO

## 2018-07-23 NOTE — LETTER
2018       RE: Adenike Chavez  [address] [place] [address]     Dear Colleague,    Thank you for referring your patient, Adenike Chavez, to the [facility] at [facility]. Please see a copy of my visit note below.    SUBJECTIVE   Adenike Chavez is a 32 year old  at 36w0d by 6 week US, here for return ob visit.  She denies ctx/LOF/vb/preE sx. +FM  Concerns today are: none.     OBJECTIVE   /74 (BP Location: Right arm, Patient Position: Chair)  Pulse 99  Ht 1.524 m (5')  Wt 86 kg (189 lb 8 oz)  LMP 10/25/2017  Breastfeeding? No  BMI 37.01 kg/m2    See Ob Flowsheet    ASSESSMENT & PLAN   32 year old  at 36w0d by 6 week US, THALIA.    1. PNC: routine ob labs reviewed   Rh pos, Rubella immune   GBS done today   Failed 1hr GTT, passed 3 hr  2. Hx CD x 2 - scheduled for ERCD with BTL at 39 weeks    RTC 1 week    Darby Harkins MD  2018          Again, thank you for allowing me to participate in the care of your patient.      Sincerely,    Darby Harkins MD

## 2018-07-24 LAB
GP B STREP DNA SPEC QL NAA+PROBE: POSITIVE
SPECIMEN SOURCE: ABNORMAL

## 2018-07-24 ASSESSMENT — ANXIETY QUESTIONNAIRES: GAD7 TOTAL SCORE: 0

## 2018-07-24 ASSESSMENT — PATIENT HEALTH QUESTIONNAIRE - PHQ9: SUM OF ALL RESPONSES TO PHQ QUESTIONS 1-9: 0

## 2018-07-27 LAB
BACTERIA SPEC CULT: NORMAL
SPECIMEN SOURCE: NORMAL

## 2018-08-01 ENCOUNTER — OFFICE VISIT (OUTPATIENT)
Dept: OBGYN | Facility: CLINIC | Age: 33
End: 2018-08-01
Attending: OBSTETRICS & GYNECOLOGY
Payer: COMMERCIAL

## 2018-08-01 VITALS
BODY MASS INDEX: 37.79 KG/M2 | WEIGHT: 192.46 LBS | HEART RATE: 92 BPM | DIASTOLIC BLOOD PRESSURE: 84 MMHG | SYSTOLIC BLOOD PRESSURE: 128 MMHG | HEIGHT: 60 IN

## 2018-08-01 DIAGNOSIS — O09.91 SUPERVISION OF HIGH RISK PREGNANCY IN FIRST TRIMESTER: Primary | ICD-10-CM

## 2018-08-01 PROCEDURE — G0463 HOSPITAL OUTPT CLINIC VISIT: HCPCS | Mod: ZF

## 2018-08-01 NOTE — PROGRESS NOTES
SUBJECTIVE   Adenike Chavez is a 32 year old  at 37w2d by 6 week US, here for return ob visit.  She denies ctx/LOF/vb/preE sx. +FM  Concerns today are: none.     OBJECTIVE   /84 (BP Location: Left arm, Patient Position: Chair)  Pulse 92  Ht 1.524 m (5')  Wt 87.3 kg (192 lb 8 oz)  LMP 10/25/2017  BMI 37.6 kg/m2    See Ob Flowsheet    ASSESSMENT & PLAN   32 year old  at 37w2d by 6 week US, THALIA.    1. PNC: routine ob labs reviewed   Rh pos, Rubella immune   GBS positive   Failed 1hr GTT, passed 3 hr  2. Hx CD x 2 - scheduled for ERCD with BTL at 39 weeks    RTC 1 week    Darby Harkins MD  2018

## 2018-08-01 NOTE — LETTER
2018       RE: Adenike Chavez  [address] [place] [address]     Dear Colleague,    Thank you for referring your patient, Adenike Chavez, to the [facility] at [facility]. Please see a copy of my visit note below.    SUBJECTIVE   Adenike Chavez is a 32 year old  at 37w2d by 6 week US, here for return ob visit.  She denies ctx/LOF/vb/preE sx. +FM  Concerns today are: none.     OBJECTIVE   /84 (BP Location: Left arm, Patient Position: Chair)  Pulse 92  Ht 1.524 m (5')  Wt 87.3 kg (192 lb 8 oz)  LMP 10/25/2017  BMI 37.6 kg/m2    See Ob Flowsheet    ASSESSMENT & PLAN   32 year old  at 37w2d by 6 week US, THALIA.    1. PNC: routine ob labs reviewed   Rh pos, Rubella immune   GBS positive   Failed 1hr GTT, passed 3 hr  2. Hx CD x 2 - scheduled for ERCD with BTL at 39 weeks    RTC 1 week    Darby Harkins MD  2018    Again, thank you for allowing me to participate in the care of your patient.      Sincerely,    Darby Harkins MD

## 2018-08-01 NOTE — MR AVS SNAPSHOT
After Visit Summary   8/1/2018    Adenike Chavez    MRN: [id number]           Patient Information     Date Of Birth          1985        Visit Information        Provider Department      8/1/2018 3:30 PM Darby Harkins MD [facility]        Today's Diagnoses     Supervision of high risk pregnancy in first trimester    -  1       Follow-ups after your visit        Your next 10 appointments already scheduled     Aug 07, 2018 11:30 AM CDT   RETURN OB with Darby Harkins MD   [facility] ([facility])    [facility] [address]  [address] [facility] [address] [phone]            Aug 13, 2018   Procedure with aDrby Harkins MD   [facility] (--)    [address] [facility] [address] [phone]              Who to contact     Please call your clinic at [phone] to:    Ask questions about your health    Make or cancel appointments    Discuss your medicines    Learn about your test results    Speak to your doctor            Additional Information About Your Visit        MyChart Information     [employer] gives you secure access to your electronic health record. If you see a primary care provider, you can also send messages to your care team and make appointments. If you have questions, please call your primary care clinic.  If you do not have a primary care provider, please call [phone] and they will assist you.      [employer] is an electronic gateway that provides easy, online access to your medical records. With [employer], you can request a clinic appointment, read your test results, renew a prescription or communicate with your care team.     To access your existing account, please contact your [facility] or call [phone] for assistance.        Care EveryWhere ID     This is your Care EveryWhere ID. This could be used by other organizations to access your [place]  medical records  [id number]        Your Vitals Were     Pulse Height Last Period BMI (Body Mass Index)          92 1.524 m (5') 10/25/2017 37.59 kg/m2         Blood Pressure from Last 3 Encounters:   08/01/18 128/84   07/23/18 109/74   06/27/18 112/78    Weight from Last 3 Encounters:   08/01/18 87.3 kg (192 lb 7.4 oz)   07/23/18 86 kg (189 lb 8 oz)   06/27/18 84 kg (185 lb 3.2 oz)              Today, you had the following     No orders found for display       Primary Care Provider Fax #    Physician No Ref-Primary [phone]       No address on file        Equal Access to Services     DUANE SAMPSON : Irwin Marc, efrain shabazz, micaela tenorio, lulu gonzalez . So [facility] [phone].    ATENCIÓN: Si habla español, tiene a burleson disposición servicios gratuitos de asistencia lingüística. Llame al [phone].    We comply with applicable federal civil rights laws and [place] laws. We do not discriminate on the basis of race, color, national origin, age, disability, sex, sexual orientation, or gender identity.            Thank you!     Thank you for choosing [facility]  for your care. Our goal is always to provide you with excellent care. Hearing back from our patients is one way we can continue to improve our services. Please take a few minutes to complete the written survey that you may receive in the mail after your visit with us. Thank you!             Your Updated Medication List - Protect others around you: Learn how to safely use, store and throw away your medicines at www.disposemymeds.org.          This list is accurate as of 8/1/18  3:50 PM.  Always use your most recent med list.                   Brand Name Dispense Instructions for use Diagnosis    albuterol (5 MG/ML) 0.5% neb solution    PROVENTIL    1 vial    Take 0.5 mLs (2.5 mg) by nebulization every 6 hours as needed for wheezing or shortness of breath / dyspnea    Cough        calcium carbonate 500 MG chewable tablet    TUMS     Take 1 chew tab by mouth daily        fluticasone 110 MCG/ACT Inhaler    FLOVENT HFA    1 Inhaler    Inhale 2 puffs into the lungs 2 times daily    Cough       prenatal multivitamin plus iron 27-0.8 MG Tabs per tablet      Take 1 tablet by mouth daily        ZANTAC PO

## 2018-08-01 NOTE — LETTER
Date:August 2, 2018      Patient was self referred, no letter generated. Do not send.        [facility] Health Information

## 2018-08-01 NOTE — NURSING NOTE
Chief Complaint   Patient presents with     Prenatal Care     37w2d       See GEORGE Chavez 8/1/2018

## 2018-08-07 ENCOUNTER — OFFICE VISIT (OUTPATIENT)
Dept: OBGYN | Facility: CLINIC | Age: 33
End: 2018-08-07
Attending: OBSTETRICS & GYNECOLOGY
Payer: COMMERCIAL

## 2018-08-07 VITALS
BODY MASS INDEX: 37.44 KG/M2 | WEIGHT: 190.7 LBS | DIASTOLIC BLOOD PRESSURE: 85 MMHG | SYSTOLIC BLOOD PRESSURE: 126 MMHG | HEIGHT: 60 IN | HEART RATE: 92 BPM

## 2018-08-07 DIAGNOSIS — O09.93 SUPERVISION OF HIGH RISK PREGNANCY IN THIRD TRIMESTER: Primary | ICD-10-CM

## 2018-08-07 RX ORDER — BREAST PUMP
1 EACH MISCELLANEOUS PRN
Qty: 1 EACH | Refills: 0 | Status: SHIPPED | OUTPATIENT
Start: 2018-08-07 | End: 2018-09-25

## 2018-08-07 NOTE — PROGRESS NOTES
[facility]  Return OB Visit    S: Denies vaginal bleeding, vaginal discharge, LOF, contractions.  Reports good fetal movement.   Feeling well today. No concerns. Excited for CS on Monday. Has had a few Ogle-Wong contractions, not consistent.     O: /85  Pulse 92  Ht 1.524 m (5')  Wt 86.5 kg (190 lb 11.2 oz)  LMP 10/25/2017  BMI 37.24 kg/m2  Weight gain: 10kg    Gen: Well-appearing, NAD    Fundal Height:  39  FHR: 137    A/P:  Adenike Chavez is a 32 year old  at 38w1d by 6wk US, here for return OB visit.  1. Hx CS x2. Repeat CS scheduled with BTL at 39 weeks  2. PNC: - Prenatal labs Rh positive, Rubella immune, GCT failed, passed 3 hr   - Immunizations   - GBS positive    CS scheduled for Monday, orders in. Return to birthplace if going in to labor before Monday.    Staffed with Dr. Shahana Huertas MD  OBGYN PGY-1  2018, 11:36 AM    [facility] Staff Note:  I examined Adenike Chavez on 2018 with Dr. Huertas and agree with the presentation, exam and plan of care documented in this note with edits by me.   Darby Harkins MD   2018

## 2018-08-07 NOTE — LETTER
2018       RE: Adenike Chavez  [address] [place] [address]     Dear Colleague,    Thank you for referring your patient, Adenike Chavez, to the [facility] at [facility]. Please see a copy of my visit note below.    [facility]  Return OB Visit    S: Denies vaginal bleeding, vaginal discharge, LOF, contractions.  Reports good fetal movement.   Feeling well today. No concerns. Excited for CS on Monday. Has had a few Stewart-Wong contractions, not consistent.     O: /85  Pulse 92  Ht 1.524 m (5')  Wt 86.5 kg (190 lb 11.2 oz)  LMP 10/25/2017  BMI 37.24 kg/m2  Weight gain: 10kg    Gen: Well-appearing, NAD    Fundal Height:  39  FHR: 137    A/P:  Adenike Chavez is a 32 year old  at 38w1d by 6wk US, here for return OB visit.  1. Hx CS x2. Repeat CS scheduled with BTL at 39 weeks  2. PNC: - Prenatal labs Rh positive, Rubella immune, GCT failed, passed 3 hr   - Immunizations   - GBS positive    CS scheduled for Monday, orders in. Return to birthplace if going in to labor before Monday.    Staffed with Dr. Shahana Huertas MD  OBGYN PGY-1  2018, 11:36 AM    [facility] Staff Note:  I examined Adenike Chavez on 2018 with Dr. Huertas and agree with the presentation, exam and plan of care documented in this note with edits by me.   Darby Harkins MD   2018          Again, thank you for allowing me to participate in the care of your patient.      Sincerely,    Darby Harkins MD

## 2018-08-07 NOTE — MR AVS SNAPSHOT
After Visit Summary   8/7/2018    Adenike Chavez    MRN: [id number]           Patient Information     Date Of Birth          1985        Visit Information        Provider Department      8/7/2018 11:30 AM Darby Harkins MD [facility]        Today's Diagnoses     Supervision of high risk pregnancy in third trimester    -  1       Follow-ups after your visit        Your next 10 appointments already scheduled     Aug 13, 2018   Procedure with Darby Harkins MD   [facility] (--)    [address]  [facility] [place] [address] [phone]              Who to contact     Please call your clinic at [phone] to:    Ask questions about your health    Make or cancel appointments    Discuss your medicines    Learn about your test results    Speak to your doctor            Additional Information About Your Visit        MyChart Information     [employer] gives you secure access to your electronic health record. If you see a primary care provider, you can also send messages to your care team and make appointments. If you have questions, please call your primary care clinic.  If you do not have a primary care provider, please call [phone] and they will assist you.      [employer] is an electronic gateway that provides easy, online access to your medical records. With [employer], you can request a clinic appointment, read your test results, renew a prescription or communicate with your care team.     To access your existing account, please contact your [facility] or call [phone] for assistance.        Care EveryWhere ID     This is your Care EveryWhere ID. This could be used by other organizations to access your [place] medical records  [id number]        Your Vitals Were     Pulse Height Last Period BMI (Body Mass Index)          92 1.524 m (5') 10/25/2017 37.24 kg/m2         Blood Pressure from Last 3 Encounters:   08/07/18 126/85   08/01/18 128/84    07/23/18 109/74    Weight from Last 3 Encounters:   08/07/18 86.5 kg (190 lb 11.2 oz)   08/01/18 87.3 kg (192 lb 7.4 oz)   07/23/18 86 kg (189 lb 8 oz)              Today, you had the following     No orders found for display         Today's Medication Changes          These changes are accurate as of 8/7/18  4:09 PM.  If you have any questions, ask your nurse or doctor.               Start taking these medicines.        Dose/Directions    breast pump Misc   Used for:  Supervision of high risk pregnancy in third trimester   Started by:  Darby Harkins MD        Dose:  1 each   1 each as needed   Quantity:  1 each   Refills:  0            Where to get your medicines      These medications were sent to [facility] [place], [place] - [address]  [address] [place] [address], [facility] [address]     Phone:  [phone]     breast pump Misc                Primary Care Provider Fax #    Physician No Ref-Primary [phone]       No address on file        Equal Access to Services     DUANE SAMPSON : Hadii jerald dennis hadasho Soomaali, waaxda luqadaha, qaybta kaalmada adeegyada, waxay coleenin hayarabella gonzalez . So [facility] [phone].    ATENCIÓN: Si habla español, tiene a burleson disposición servicios gratuitos de asistencia lingüística. Llame al [phone].    We comply with applicable federal civil rights laws and [place] laws. We do not discriminate on the basis of race, color, national origin, age, disability, sex, sexual orientation, or gender identity.            Thank you!     Thank you for choosing [facility]  for your care. Our goal is always to provide you with excellent care. Hearing back from our patients is one way we can continue to improve our services. Please take a few minutes to complete the written survey that you may receive in the mail after your visit with us. Thank you!             Your Updated Medication List - Protect others around you: Learn how to  safely use, store and throw away your medicines at www.disposemymeds.org.          This list is accurate as of 8/7/18  4:09 PM.  Always use your most recent med list.                   Brand Name Dispense Instructions for use Diagnosis    albuterol (5 MG/ML) 0.5% neb solution    PROVENTIL    1 vial    Take 0.5 mLs (2.5 mg) by nebulization every 6 hours as needed for wheezing or shortness of breath / dyspnea    Cough       breast pump Misc     1 each    1 each as needed    Supervision of high risk pregnancy in third trimester       calcium carbonate 500 MG chewable tablet    TUMS     Take 1 chew tab by mouth daily        fluticasone 110 MCG/ACT Inhaler    FLOVENT HFA    1 Inhaler    Inhale 2 puffs into the lungs 2 times daily    Cough       prenatal multivitamin plus iron 27-0.8 MG Tabs per tablet      Take 1 tablet by mouth daily        ZANTAC PO

## 2018-08-07 NOTE — LETTER
Date:August 8, 2018      Patient was self referred, no letter generated. Do not send.        [facility] Health Information

## 2018-08-09 ENCOUNTER — ANESTHESIA EVENT (OUTPATIENT)
Dept: OBGYN | Facility: CLINIC | Age: 33
End: 2018-08-09
Payer: COMMERCIAL

## 2018-08-12 DIAGNOSIS — Z98.891 H/O CESAREAN SECTION: ICD-10-CM

## 2018-08-12 DIAGNOSIS — Z98.891 H/O CESAREAN SECTION: Primary | ICD-10-CM

## 2018-08-12 LAB
ABO + RH BLD: NORMAL
ABO + RH BLD: NORMAL
BLD GP AB SCN SERPL QL: NORMAL
BLOOD BANK CMNT PATIENT-IMP: NORMAL
ERYTHROCYTE [DISTWIDTH] IN BLOOD BY AUTOMATED COUNT: 14 % (ref 10–15)
HCT VFR BLD AUTO: 36.8 % (ref 35–47)
HGB BLD-MCNC: 11.9 G/DL (ref 11.7–15.7)
MCH RBC QN AUTO: 26.8 PG (ref 26.5–33)
MCHC RBC AUTO-ENTMCNC: 32.3 G/DL (ref 31.5–36.5)
MCV RBC AUTO: 83 FL (ref 78–100)
PLATELET # BLD AUTO: 210 10E9/L (ref 150–450)
RBC # BLD AUTO: 4.44 10E12/L (ref 3.8–5.2)
SPECIMEN EXP DATE BLD: NORMAL
T PALLIDUM AB SER QL: NONREACTIVE
WBC # BLD AUTO: 10.2 10E9/L (ref 4–11)

## 2018-08-12 PROCEDURE — 86901 BLOOD TYPING SEROLOGIC RH(D): CPT | Performed by: OBSTETRICS & GYNECOLOGY

## 2018-08-12 PROCEDURE — 85027 COMPLETE CBC AUTOMATED: CPT | Performed by: OBSTETRICS & GYNECOLOGY

## 2018-08-12 PROCEDURE — 86850 RBC ANTIBODY SCREEN: CPT | Performed by: OBSTETRICS & GYNECOLOGY

## 2018-08-12 PROCEDURE — 86780 TREPONEMA PALLIDUM: CPT | Performed by: OBSTETRICS & GYNECOLOGY

## 2018-08-12 PROCEDURE — 86900 BLOOD TYPING SEROLOGIC ABO: CPT | Performed by: OBSTETRICS & GYNECOLOGY

## 2018-08-12 PROCEDURE — 36415 COLL VENOUS BLD VENIPUNCTURE: CPT | Performed by: OBSTETRICS & GYNECOLOGY

## 2018-08-13 ENCOUNTER — HOSPITAL ENCOUNTER (INPATIENT)
Facility: CLINIC | Age: 33
LOS: 3 days | Discharge: HOME HEALTH | End: 2018-08-16
Attending: OBSTETRICS & GYNECOLOGY | Admitting: OBSTETRICS & GYNECOLOGY
Payer: COMMERCIAL

## 2018-08-13 ENCOUNTER — SURGERY (OUTPATIENT)
Age: 33
End: 2018-08-13

## 2018-08-13 ENCOUNTER — ANESTHESIA (OUTPATIENT)
Dept: OBGYN | Facility: CLINIC | Age: 33
End: 2018-08-13
Payer: COMMERCIAL

## 2018-08-13 DIAGNOSIS — Z98.891 S/P CESAREAN SECTION: Primary | ICD-10-CM

## 2018-08-13 DIAGNOSIS — D62 ANEMIA DUE TO BLOOD LOSS, ACUTE: ICD-10-CM

## 2018-08-13 PROCEDURE — 25000128 H RX IP 250 OP 636: Performed by: ANESTHESIOLOGY

## 2018-08-13 PROCEDURE — 86901 BLOOD TYPING SEROLOGIC RH(D): CPT | Performed by: OBSTETRICS & GYNECOLOGY

## 2018-08-13 PROCEDURE — 25000125 ZZHC RX 250: Performed by: ANESTHESIOLOGY

## 2018-08-13 PROCEDURE — C9290 INJ, BUPIVACAINE LIPOSOME: HCPCS | Performed by: ANESTHESIOLOGY

## 2018-08-13 PROCEDURE — 25000128 H RX IP 250 OP 636

## 2018-08-13 PROCEDURE — 40000170 ZZH STATISTIC PRE-PROCEDURE ASSESSMENT II: Performed by: OBSTETRICS & GYNECOLOGY

## 2018-08-13 PROCEDURE — 25000125 ZZHC RX 250

## 2018-08-13 PROCEDURE — 86850 RBC ANTIBODY SCREEN: CPT | Performed by: OBSTETRICS & GYNECOLOGY

## 2018-08-13 PROCEDURE — 37000008 ZZH ANESTHESIA TECHNICAL FEE, 1ST 30 MIN: Performed by: OBSTETRICS & GYNECOLOGY

## 2018-08-13 PROCEDURE — 27210794 ZZH OR GENERAL SUPPLY STERILE: Performed by: OBSTETRICS & GYNECOLOGY

## 2018-08-13 PROCEDURE — 25000128 H RX IP 250 OP 636: Performed by: OBSTETRICS & GYNECOLOGY

## 2018-08-13 PROCEDURE — 71000014 ZZH RECOVERY PHASE 1 LEVEL 2 FIRST HR: Performed by: OBSTETRICS & GYNECOLOGY

## 2018-08-13 PROCEDURE — C1765 ADHESION BARRIER: HCPCS | Performed by: OBSTETRICS & GYNECOLOGY

## 2018-08-13 PROCEDURE — 12000032 ZZH R&B OB CRITICAL UMMC

## 2018-08-13 PROCEDURE — 25000132 ZZH RX MED GY IP 250 OP 250 PS 637: Performed by: OBSTETRICS & GYNECOLOGY

## 2018-08-13 PROCEDURE — 36000059 ZZH SURGERY LEVEL 3 EA 15 ADDTL MIN UMMC: Performed by: OBSTETRICS & GYNECOLOGY

## 2018-08-13 PROCEDURE — 36000057 ZZH SURGERY LEVEL 3 1ST 30 MIN - UMMC: Performed by: OBSTETRICS & GYNECOLOGY

## 2018-08-13 PROCEDURE — 86900 BLOOD TYPING SEROLOGIC ABO: CPT | Performed by: OBSTETRICS & GYNECOLOGY

## 2018-08-13 PROCEDURE — 25000125 ZZHC RX 250: Performed by: STUDENT IN AN ORGANIZED HEALTH CARE EDUCATION/TRAINING PROGRAM

## 2018-08-13 PROCEDURE — 37000009 ZZH ANESTHESIA TECHNICAL FEE, EACH ADDTL 15 MIN: Performed by: OBSTETRICS & GYNECOLOGY

## 2018-08-13 RX ORDER — ONDANSETRON 2 MG/ML
4 INJECTION INTRAMUSCULAR; INTRAVENOUS EVERY 6 HOURS PRN
Status: DISCONTINUED | OUTPATIENT
Start: 2018-08-13 | End: 2018-08-16 | Stop reason: HOSPADM

## 2018-08-13 RX ORDER — ACETAMINOPHEN 325 MG/1
975 TABLET ORAL EVERY 8 HOURS
Status: DISCONTINUED | OUTPATIENT
Start: 2018-08-13 | End: 2018-08-16 | Stop reason: HOSPADM

## 2018-08-13 RX ORDER — OXYCODONE HYDROCHLORIDE 5 MG/1
5-10 TABLET ORAL EVERY 4 HOURS PRN
Status: DISCONTINUED | OUTPATIENT
Start: 2018-08-13 | End: 2018-08-13

## 2018-08-13 RX ORDER — CEFAZOLIN SODIUM 1 G/3ML
1 INJECTION, POWDER, FOR SOLUTION INTRAMUSCULAR; INTRAVENOUS SEE ADMIN INSTRUCTIONS
Status: DISCONTINUED | OUTPATIENT
Start: 2018-08-13 | End: 2018-08-13 | Stop reason: HOSPADM

## 2018-08-13 RX ORDER — SIMETHICONE 80 MG
80 TABLET,CHEWABLE ORAL 4 TIMES DAILY PRN
Status: DISCONTINUED | OUTPATIENT
Start: 2018-08-13 | End: 2018-08-13

## 2018-08-13 RX ORDER — OXYTOCIN 10 [USP'U]/ML
10 INJECTION, SOLUTION INTRAMUSCULAR; INTRAVENOUS
Status: DISCONTINUED | OUTPATIENT
Start: 2018-08-13 | End: 2018-08-16 | Stop reason: HOSPADM

## 2018-08-13 RX ORDER — LIDOCAINE 40 MG/G
CREAM TOPICAL
Status: DISCONTINUED | OUTPATIENT
Start: 2018-08-13 | End: 2018-08-13

## 2018-08-13 RX ORDER — LANOLIN 100 %
OINTMENT (GRAM) TOPICAL
Status: DISCONTINUED | OUTPATIENT
Start: 2018-08-13 | End: 2018-08-13

## 2018-08-13 RX ORDER — HYDROCORTISONE 2.5 %
CREAM (GRAM) TOPICAL 3 TIMES DAILY PRN
Status: DISCONTINUED | OUTPATIENT
Start: 2018-08-13 | End: 2018-08-16 | Stop reason: HOSPADM

## 2018-08-13 RX ORDER — ACETAMINOPHEN 325 MG/1
975 TABLET ORAL EVERY 8 HOURS
Status: DISCONTINUED | OUTPATIENT
Start: 2018-08-13 | End: 2018-08-13

## 2018-08-13 RX ORDER — OXYTOCIN 10 [USP'U]/ML
10 INJECTION, SOLUTION INTRAMUSCULAR; INTRAVENOUS
Status: DISCONTINUED | OUTPATIENT
Start: 2018-08-13 | End: 2018-08-13

## 2018-08-13 RX ORDER — OXYTOCIN/0.9 % SODIUM CHLORIDE 30/500 ML
PLASTIC BAG, INJECTION (ML) INTRAVENOUS
Status: COMPLETED
Start: 2018-08-13 | End: 2018-08-13

## 2018-08-13 RX ORDER — MISOPROSTOL 200 UG/1
400 TABLET ORAL
Status: DISCONTINUED | OUTPATIENT
Start: 2018-08-13 | End: 2018-08-13

## 2018-08-13 RX ORDER — NALOXONE HYDROCHLORIDE 0.4 MG/ML
.1-.4 INJECTION, SOLUTION INTRAMUSCULAR; INTRAVENOUS; SUBCUTANEOUS
Status: DISCONTINUED | OUTPATIENT
Start: 2018-08-13 | End: 2018-08-13

## 2018-08-13 RX ORDER — DEXTROSE, SODIUM CHLORIDE, SODIUM LACTATE, POTASSIUM CHLORIDE, AND CALCIUM CHLORIDE 5; .6; .31; .03; .02 G/100ML; G/100ML; G/100ML; G/100ML; G/100ML
INJECTION, SOLUTION INTRAVENOUS CONTINUOUS
Status: DISCONTINUED | OUTPATIENT
Start: 2018-08-13 | End: 2018-08-16 | Stop reason: HOSPADM

## 2018-08-13 RX ORDER — DEXTROSE, SODIUM CHLORIDE, SODIUM LACTATE, POTASSIUM CHLORIDE, AND CALCIUM CHLORIDE 5; .6; .31; .03; .02 G/100ML; G/100ML; G/100ML; G/100ML; G/100ML
INJECTION, SOLUTION INTRAVENOUS CONTINUOUS
Status: DISCONTINUED | OUTPATIENT
Start: 2018-08-13 | End: 2018-08-13

## 2018-08-13 RX ORDER — ACETAMINOPHEN 325 MG/1
650 TABLET ORAL EVERY 4 HOURS PRN
Status: DISCONTINUED | OUTPATIENT
Start: 2018-08-16 | End: 2018-08-13

## 2018-08-13 RX ORDER — LIDOCAINE 40 MG/G
CREAM TOPICAL
Status: DISCONTINUED | OUTPATIENT
Start: 2018-08-13 | End: 2018-08-16 | Stop reason: HOSPADM

## 2018-08-13 RX ORDER — LANOLIN 100 %
OINTMENT (GRAM) TOPICAL
Status: DISCONTINUED | OUTPATIENT
Start: 2018-08-13 | End: 2018-08-16 | Stop reason: HOSPADM

## 2018-08-13 RX ORDER — OXYTOCIN/0.9 % SODIUM CHLORIDE 30/500 ML
340 PLASTIC BAG, INJECTION (ML) INTRAVENOUS CONTINUOUS PRN
Status: DISCONTINUED | OUTPATIENT
Start: 2018-08-13 | End: 2018-08-13

## 2018-08-13 RX ORDER — FENTANYL CITRATE 50 UG/ML
INJECTION, SOLUTION INTRAMUSCULAR; INTRAVENOUS PRN
Status: DISCONTINUED | OUTPATIENT
Start: 2018-08-13 | End: 2018-08-13

## 2018-08-13 RX ORDER — SODIUM CHLORIDE, SODIUM LACTATE, POTASSIUM CHLORIDE, CALCIUM CHLORIDE 600; 310; 30; 20 MG/100ML; MG/100ML; MG/100ML; MG/100ML
INJECTION, SOLUTION INTRAVENOUS CONTINUOUS
Status: DISCONTINUED | OUTPATIENT
Start: 2018-08-13 | End: 2018-08-13 | Stop reason: HOSPADM

## 2018-08-13 RX ORDER — ONDANSETRON 2 MG/ML
INJECTION INTRAMUSCULAR; INTRAVENOUS PRN
Status: DISCONTINUED | OUTPATIENT
Start: 2018-08-13 | End: 2018-08-13

## 2018-08-13 RX ORDER — KETOROLAC TROMETHAMINE 30 MG/ML
INJECTION, SOLUTION INTRAMUSCULAR; INTRAVENOUS PRN
Status: DISCONTINUED | OUTPATIENT
Start: 2018-08-13 | End: 2018-08-13

## 2018-08-13 RX ORDER — OXYCODONE HYDROCHLORIDE 5 MG/1
5-10 TABLET ORAL
Status: DISCONTINUED | OUTPATIENT
Start: 2018-08-13 | End: 2018-08-16 | Stop reason: HOSPADM

## 2018-08-13 RX ORDER — BISACODYL 10 MG
10 SUPPOSITORY, RECTAL RECTAL DAILY PRN
Status: DISCONTINUED | OUTPATIENT
Start: 2018-08-15 | End: 2018-08-13

## 2018-08-13 RX ORDER — IBUPROFEN 800 MG/1
800 TABLET, FILM COATED ORAL EVERY 6 HOURS PRN
Status: DISCONTINUED | OUTPATIENT
Start: 2018-08-13 | End: 2018-08-16 | Stop reason: HOSPADM

## 2018-08-13 RX ORDER — BUPIVACAINE HYDROCHLORIDE 7.5 MG/ML
INJECTION, SOLUTION INTRASPINAL PRN
Status: DISCONTINUED | OUTPATIENT
Start: 2018-08-13 | End: 2018-08-13

## 2018-08-13 RX ORDER — NALBUPHINE HYDROCHLORIDE 10 MG/ML
2.5-5 INJECTION, SOLUTION INTRAMUSCULAR; INTRAVENOUS; SUBCUTANEOUS EVERY 6 HOURS PRN
Status: DISCONTINUED | OUTPATIENT
Start: 2018-08-13 | End: 2018-08-13

## 2018-08-13 RX ORDER — ONDANSETRON 4 MG/1
4 TABLET, ORALLY DISINTEGRATING ORAL EVERY 30 MIN PRN
Status: DISCONTINUED | OUTPATIENT
Start: 2018-08-13 | End: 2018-08-13 | Stop reason: HOSPADM

## 2018-08-13 RX ORDER — BUPIVACAINE HYDROCHLORIDE 7.5 MG/ML
INJECTION, SOLUTION INTRASPINAL
Status: DISCONTINUED
Start: 2018-08-13 | End: 2018-08-13 | Stop reason: HOSPADM

## 2018-08-13 RX ORDER — IBUPROFEN 600 MG/1
600 TABLET, FILM COATED ORAL EVERY 6 HOURS PRN
Status: DISCONTINUED | OUTPATIENT
Start: 2018-08-13 | End: 2018-08-16 | Stop reason: HOSPADM

## 2018-08-13 RX ORDER — HYDROCORTISONE 2.5 %
CREAM (GRAM) TOPICAL 3 TIMES DAILY PRN
Status: DISCONTINUED | OUTPATIENT
Start: 2018-08-13 | End: 2018-08-13

## 2018-08-13 RX ORDER — KETOROLAC TROMETHAMINE 30 MG/ML
30 INJECTION, SOLUTION INTRAMUSCULAR; INTRAVENOUS EVERY 6 HOURS
Status: DISCONTINUED | OUTPATIENT
Start: 2018-08-13 | End: 2018-08-13

## 2018-08-13 RX ORDER — AMOXICILLIN 250 MG
1 CAPSULE ORAL 2 TIMES DAILY PRN
Status: DISCONTINUED | OUTPATIENT
Start: 2018-08-13 | End: 2018-08-13

## 2018-08-13 RX ORDER — KETOROLAC TROMETHAMINE 30 MG/ML
30 INJECTION, SOLUTION INTRAMUSCULAR; INTRAVENOUS EVERY 6 HOURS
Status: DISCONTINUED | OUTPATIENT
Start: 2018-08-13 | End: 2018-08-14

## 2018-08-13 RX ORDER — ONDANSETRON 2 MG/ML
4 INJECTION INTRAMUSCULAR; INTRAVENOUS EVERY 30 MIN PRN
Status: DISCONTINUED | OUTPATIENT
Start: 2018-08-13 | End: 2018-08-13 | Stop reason: HOSPADM

## 2018-08-13 RX ORDER — SODIUM CHLORIDE, SODIUM LACTATE, POTASSIUM CHLORIDE, CALCIUM CHLORIDE 600; 310; 30; 20 MG/100ML; MG/100ML; MG/100ML; MG/100ML
INJECTION, SOLUTION INTRAVENOUS CONTINUOUS PRN
Status: DISCONTINUED | OUTPATIENT
Start: 2018-08-13 | End: 2018-08-13

## 2018-08-13 RX ORDER — OXYTOCIN/0.9 % SODIUM CHLORIDE 30/500 ML
PLASTIC BAG, INJECTION (ML) INTRAVENOUS CONTINUOUS PRN
Status: DISCONTINUED | OUTPATIENT
Start: 2018-08-13 | End: 2018-08-13

## 2018-08-13 RX ORDER — CEFAZOLIN SODIUM 2 G/100ML
2 INJECTION, SOLUTION INTRAVENOUS
Status: COMPLETED | OUTPATIENT
Start: 2018-08-13 | End: 2018-08-13

## 2018-08-13 RX ORDER — DIPHENHYDRAMINE HCL 25 MG
25 CAPSULE ORAL EVERY 6 HOURS PRN
Status: DISCONTINUED | OUTPATIENT
Start: 2018-08-13 | End: 2018-08-13

## 2018-08-13 RX ORDER — DIPHENHYDRAMINE HYDROCHLORIDE 50 MG/ML
25 INJECTION INTRAMUSCULAR; INTRAVENOUS EVERY 6 HOURS PRN
Status: DISCONTINUED | OUTPATIENT
Start: 2018-08-13 | End: 2018-08-13

## 2018-08-13 RX ORDER — OXYTOCIN/0.9 % SODIUM CHLORIDE 30/500 ML
100 PLASTIC BAG, INJECTION (ML) INTRAVENOUS CONTINUOUS
Status: DISCONTINUED | OUTPATIENT
Start: 2018-08-13 | End: 2018-08-13

## 2018-08-13 RX ORDER — NALOXONE HYDROCHLORIDE 0.4 MG/ML
.1-.4 INJECTION, SOLUTION INTRAMUSCULAR; INTRAVENOUS; SUBCUTANEOUS
Status: DISCONTINUED | OUTPATIENT
Start: 2018-08-13 | End: 2018-08-16 | Stop reason: HOSPADM

## 2018-08-13 RX ORDER — DIPHENHYDRAMINE HCL 25 MG
25 CAPSULE ORAL EVERY 6 HOURS PRN
Status: DISCONTINUED | OUTPATIENT
Start: 2018-08-13 | End: 2018-08-16 | Stop reason: HOSPADM

## 2018-08-13 RX ORDER — ONDANSETRON 2 MG/ML
4 INJECTION INTRAMUSCULAR; INTRAVENOUS EVERY 6 HOURS PRN
Status: DISCONTINUED | OUTPATIENT
Start: 2018-08-13 | End: 2018-08-13

## 2018-08-13 RX ORDER — OXYTOCIN/0.9 % SODIUM CHLORIDE 30/500 ML
100 PLASTIC BAG, INJECTION (ML) INTRAVENOUS CONTINUOUS
Status: DISCONTINUED | OUTPATIENT
Start: 2018-08-13 | End: 2018-08-16 | Stop reason: HOSPADM

## 2018-08-13 RX ORDER — IBUPROFEN 400 MG/1
400 TABLET, FILM COATED ORAL EVERY 6 HOURS PRN
Status: DISCONTINUED | OUTPATIENT
Start: 2018-08-13 | End: 2018-08-16 | Stop reason: HOSPADM

## 2018-08-13 RX ORDER — AMOXICILLIN 250 MG
1 CAPSULE ORAL 2 TIMES DAILY PRN
Status: DISCONTINUED | OUTPATIENT
Start: 2018-08-13 | End: 2018-08-16 | Stop reason: HOSPADM

## 2018-08-13 RX ORDER — FENTANYL CITRATE 50 UG/ML
25-50 INJECTION, SOLUTION INTRAMUSCULAR; INTRAVENOUS
Status: DISCONTINUED | OUTPATIENT
Start: 2018-08-13 | End: 2018-08-13 | Stop reason: HOSPADM

## 2018-08-13 RX ORDER — SIMETHICONE 80 MG
80 TABLET,CHEWABLE ORAL 4 TIMES DAILY PRN
Status: DISCONTINUED | OUTPATIENT
Start: 2018-08-13 | End: 2018-08-16 | Stop reason: HOSPADM

## 2018-08-13 RX ORDER — BUPIVACAINE HYDROCHLORIDE AND EPINEPHRINE 2.5; 5 MG/ML; UG/ML
INJECTION, SOLUTION INFILTRATION; PERINEURAL PRN
Status: DISCONTINUED | OUTPATIENT
Start: 2018-08-13 | End: 2018-08-13

## 2018-08-13 RX ORDER — DIPHENHYDRAMINE HYDROCHLORIDE 50 MG/ML
25 INJECTION INTRAMUSCULAR; INTRAVENOUS EVERY 6 HOURS PRN
Status: DISCONTINUED | OUTPATIENT
Start: 2018-08-13 | End: 2018-08-16 | Stop reason: HOSPADM

## 2018-08-13 RX ORDER — ACETAMINOPHEN 325 MG/1
650 TABLET ORAL EVERY 4 HOURS PRN
Status: DISCONTINUED | OUTPATIENT
Start: 2018-08-16 | End: 2018-08-16 | Stop reason: HOSPADM

## 2018-08-13 RX ORDER — MISOPROSTOL 200 UG/1
400 TABLET ORAL
Status: DISCONTINUED | OUTPATIENT
Start: 2018-08-13 | End: 2018-08-16 | Stop reason: HOSPADM

## 2018-08-13 RX ORDER — MORPHINE SULFATE 1 MG/ML
INJECTION, SOLUTION EPIDURAL; INTRATHECAL; INTRAVENOUS
Status: DISCONTINUED
Start: 2018-08-13 | End: 2018-08-13 | Stop reason: HOSPADM

## 2018-08-13 RX ORDER — MORPHINE SULFATE 1 MG/ML
INJECTION, SOLUTION EPIDURAL; INTRATHECAL; INTRAVENOUS PRN
Status: DISCONTINUED | OUTPATIENT
Start: 2018-08-13 | End: 2018-08-13

## 2018-08-13 RX ORDER — CITRIC ACID/SODIUM CITRATE 334-500MG
30 SOLUTION, ORAL ORAL
Status: COMPLETED | OUTPATIENT
Start: 2018-08-13 | End: 2018-08-13

## 2018-08-13 RX ORDER — OXYTOCIN/0.9 % SODIUM CHLORIDE 30/500 ML
340 PLASTIC BAG, INJECTION (ML) INTRAVENOUS CONTINUOUS PRN
Status: DISCONTINUED | OUTPATIENT
Start: 2018-08-13 | End: 2018-08-16 | Stop reason: HOSPADM

## 2018-08-13 RX ORDER — BISACODYL 10 MG
10 SUPPOSITORY, RECTAL RECTAL DAILY PRN
Status: DISCONTINUED | OUTPATIENT
Start: 2018-08-15 | End: 2018-08-16 | Stop reason: HOSPADM

## 2018-08-13 RX ORDER — AMOXICILLIN 250 MG
2 CAPSULE ORAL 2 TIMES DAILY PRN
Status: DISCONTINUED | OUTPATIENT
Start: 2018-08-13 | End: 2018-08-16 | Stop reason: HOSPADM

## 2018-08-13 RX ORDER — AMOXICILLIN 250 MG
2 CAPSULE ORAL 2 TIMES DAILY PRN
Status: DISCONTINUED | OUTPATIENT
Start: 2018-08-13 | End: 2018-08-13

## 2018-08-13 RX ORDER — EPHEDRINE SULFATE 50 MG/ML
5 INJECTION, SOLUTION INTRAMUSCULAR; INTRAVENOUS; SUBCUTANEOUS
Status: DISCONTINUED | OUTPATIENT
Start: 2018-08-13 | End: 2018-08-13

## 2018-08-13 RX ADMIN — ACETAMINOPHEN 975 MG: 325 TABLET, FILM COATED ORAL at 16:57

## 2018-08-13 RX ADMIN — SODIUM CITRATE AND CITRIC ACID MONOHYDRATE 30 ML: 500; 334 SOLUTION ORAL at 10:20

## 2018-08-13 RX ADMIN — Medication 100 ML/HR: at 19:37

## 2018-08-13 RX ADMIN — KETOROLAC TROMETHAMINE 30 MG: 30 INJECTION, SOLUTION INTRAMUSCULAR at 12:53

## 2018-08-13 RX ADMIN — FENTANYL CITRATE 25 MCG: 50 INJECTION, SOLUTION INTRAMUSCULAR; INTRAVENOUS at 14:55

## 2018-08-13 RX ADMIN — SODIUM CHLORIDE, SODIUM LACTATE, POTASSIUM CHLORIDE, CALCIUM CHLORIDE AND DEXTROSE MONOHYDRATE: 5; 600; 310; 30; 20 INJECTION, SOLUTION INTRAVENOUS at 20:25

## 2018-08-13 RX ADMIN — KETOROLAC TROMETHAMINE 30 MG: 30 INJECTION, SOLUTION INTRAMUSCULAR at 18:35

## 2018-08-13 RX ADMIN — MORPHINE SULFATE 0.1 MG: 1 INJECTION, SOLUTION EPIDURAL; INTRATHECAL; INTRAVENOUS at 11:35

## 2018-08-13 RX ADMIN — SODIUM CHLORIDE, POTASSIUM CHLORIDE, SODIUM LACTATE AND CALCIUM CHLORIDE: 600; 310; 30; 20 INJECTION, SOLUTION INTRAVENOUS at 11:17

## 2018-08-13 RX ADMIN — OXYTOCIN-SODIUM CHLORIDE 0.9% IV SOLN 30 UNIT/500ML 300 ML/HR: 30-0.9/5 SOLUTION at 12:08

## 2018-08-13 RX ADMIN — SENNOSIDES AND DOCUSATE SODIUM 1 TABLET: 8.6; 5 TABLET ORAL at 19:41

## 2018-08-13 RX ADMIN — PHENYLEPHRINE HYDROCHLORIDE 0.5 MCG/KG/MIN: 10 INJECTION, SOLUTION INTRAMUSCULAR; INTRAVENOUS; SUBCUTANEOUS at 11:36

## 2018-08-13 RX ADMIN — BUPIVACAINE HYDROCHLORIDE AND EPINEPHRINE BITARTRATE 20 ML: 2.5; .005 INJECTION, SOLUTION INFILTRATION; PERINEURAL at 13:58

## 2018-08-13 RX ADMIN — SODIUM CHLORIDE, POTASSIUM CHLORIDE, SODIUM LACTATE AND CALCIUM CHLORIDE: 600; 310; 30; 20 INJECTION, SOLUTION INTRAVENOUS at 09:37

## 2018-08-13 RX ADMIN — BUPIVACAINE 20 ML: 13.3 INJECTION, SUSPENSION, LIPOSOMAL INFILTRATION at 13:58

## 2018-08-13 RX ADMIN — SODIUM CHLORIDE, POTASSIUM CHLORIDE, SODIUM LACTATE AND CALCIUM CHLORIDE: 600; 310; 30; 20 INJECTION, SOLUTION INTRAVENOUS at 11:26

## 2018-08-13 RX ADMIN — Medication 100 ML/HR: at 14:43

## 2018-08-13 RX ADMIN — CEFAZOLIN SODIUM 2 G: 2 INJECTION, SOLUTION INTRAVENOUS at 11:42

## 2018-08-13 RX ADMIN — OXYCODONE HYDROCHLORIDE 5 MG: 5 TABLET ORAL at 17:55

## 2018-08-13 RX ADMIN — OXYCODONE HYDROCHLORIDE 5 MG: 5 TABLET ORAL at 21:25

## 2018-08-13 RX ADMIN — FENTANYL CITRATE 15 MCG: 50 INJECTION, SOLUTION INTRAMUSCULAR; INTRAVENOUS at 11:35

## 2018-08-13 RX ADMIN — BUPIVACAINE HYDROCHLORIDE IN DEXTROSE 1.6 ML: 7.5 INJECTION, SOLUTION SUBARACHNOID at 11:35

## 2018-08-13 RX ADMIN — ONDANSETRON 4 MG: 2 INJECTION INTRAMUSCULAR; INTRAVENOUS at 12:07

## 2018-08-13 ASSESSMENT — ACTIVITIES OF DAILY LIVING (ADL)
SWALLOWING: 0-->SWALLOWS FOODS/LIQUIDS WITHOUT DIFFICULTY
COGNITION: 0 - NO COGNITION ISSUES REPORTED
RETIRED_EATING: 0-->INDEPENDENT
BATHING: 0-->INDEPENDENT
RETIRED_COMMUNICATION: 0-->UNDERSTANDS/COMMUNICATES WITHOUT DIFFICULTY
DRESS: 0-->INDEPENDENT
TOILETING: 0-->INDEPENDENT
FALL_HISTORY_WITHIN_LAST_SIX_MONTHS: NO
TRANSFERRING: 0-->INDEPENDENT
AMBULATION: 0-->INDEPENDENT

## 2018-08-13 NOTE — OP NOTE
General acute hospital   OPERATIVE NOTE:  SECTION     Surgery Date:  2018  Surgeon(s): Darby Harkins MD  Assistants:  Fatmata Srivastava MD, PGY2    Preoperative Diagnoses:  1.  at 39w0d  2. History of  section x2  3. GBS+  4. Failed GCT, passed GTT    Postoperative diagnoses:  1.  at 39w0d, now delivered    Procedure performed:  Repeat low segment transverse  section via Pfannenstiel skin incision with double layer uterine closure    Anesthesia:  Spinal with duramorph  Quant Blood Loss (mL): 936 mL  Fluid replacement: 900 mL crystalloid.  Urine output: 100 mL clear urine at the end of the case   Specimens: Cord blood and segement  Complications: None      Operative findings:   - Single, viable female infant at 1204 hours on 2018. Apgars of 9 and 9 at one and five minutes.  Birth weight: 3620 g.  Fetal presentation: CHASE. Amniotic fluid: clear.    - Placenta intact with 3 vessel cord.     - Normal appearing uterus, fallopian tubes, ovaries.   - Moderate recto-fascial adhesions. No abdominal wall adhesions    Indication: Adenike Chavez is a 32 year old, , who was admitted at 39w0d by 6w4d ultrasound for scheduled repeat  section due to history of 2 prior c-sections. The risks, benefits, and alternatives of  delivery were explained and the patient agreed to proceed.     Procedure details:  After obtaining informed consent, the patient was taken to the operating room. She received 2g Ancef prior to the skin incision. She was placed in the dorsal supine position with a leftward tilt and prepped and draped in the usual sterile fashion.     Following test of adequate spinal anesthesia, the abdomen was entered through a Pfannenstiel skin incision through her previous scar. The skin incision was made sharply and carried through the subcutaneous tissue to the fascia.  Fascia was incised in the midline and extended laterally with the John  scissors.  The superior margin of the fascial incision was grasped with Kocher clamps and dissected from the underlying muscle with sharp and blunt dissecton, which was then repeated at the lower margin of the fascial incision.  The muscle was  in the midline.      The peritoneum was entered bluntly and the opening extended by digital dissection with care to avoid the bladder. Tight bands of peritoneum were taken down with cautery. A bladder blade was placed. The bladder reflection was noted to be adhered highly to the midline above the lower uterine segment. The vesicouterine peritoneum above this was entered sharply with Metzenbaum scissors and the bladder flap was created both sharply and digitally and the bladder blade replaced. The lower segment of the uterus was opened sharply in a transverse fashion with the scalpel and extended with digital pressure. The infant's head was noted to be in the CHASE position. It was elevated to the level of the hysterotomy and was delivered atraumatically, shoulders delivered easily thereafter. The cord was doubly clamped after 60 seconds and cut and the infant was handed off to the waiting nursing staff. A segment of the cord was cut and set aside for cord gases if needed.     The placenta was expressed.  The uterus was exteriorized from the abdomen and cleared of all clots and debris.  The uterus was massaged and was noted to be firm.  Pitocin was given through the running IV.  With vigorous massage as well as administration of pitocin, good uterine tone was achieved. The hysterotomy was repaired with 0-monocryl suture in a running locked fashion. A 2nd layer of 0-monocryl was used to imbricate the incision and good hemostasis was achieved. The bladder flap was inspected and found to be hemostatic.  The posterior cul-de-sac was suctioned and the uterus was returned to the abdomen.      The bilateral pericolic gutters were cleared of clots and debris.  The hysterotomy  was again inspected and found to be hemostatic.  The abdominal wall was examined and also found to be hemostatic.  The fascia was closed with two running suture of 0-PDS meeting in the midline.  Subcutaneous tissue was irrigated. Areas that were not hemostatic were controlled with cautery. The subcutaneous tissue was greater than 3cm in depth and were re-approximated with 3-0 vicryl in a simple interrupted fashion. The skin was closed with 4-0 monocryl. The patient tolerated the procedure well and was taken to the recovery room in stable condition. All sponge, needle and instrument counts were correct x2.    Dr. Harkins was present for the entire procedure.     Fatmata Srivastava MD  Ob/Gyn PGY-2  08/13/18 2:46 PM         Staff MD Note  I was present and scrubbed for the entire procedure noted above.  I agree with the description above and any necessary changes have been made by me.  Darby Harkins MD  8/13/2018

## 2018-08-13 NOTE — BRIEF OP NOTE
[facility]  Brief Operative Note    Date of Surgery: 2018    Preop Dx:    -  at 39w0d   - H/o  delivery x2  - GBS+  - Failed GCT, passed GTT    Postop Dx:     - , now delivered    Procedure: Repeat low transverse  section with double layer closure via pfannenstiel skin incision    Surgeon:  Darby Harkins MD    Assistants: Fatmata Srivastava MD PGY-2, Carie Nunez MS-3       Anesthesia: Spinal anesthesia    QBL:  936 cc    IVF:  900 cc    UOP:  100 cc    Drains:  Andino catheter    Specimen: Placenta, cord blood and segment    Complications: None apparent    Findings:   1. Single, viable female infant at 1204 hours on 2018. Apgars of 9 and 9 at one and five minutes.  Birth weight: pending  Fetal presentation: CHASE. Amniotic fluid: clear.    2. Placenta intact with 3 vessel cord.     4. Normal appearing uterus, fallopian tubes, ovaries.   5. Moderate rectofascial adhesions. No abdominal wall adhesions     Disposition: Transferred in stable condition to the PACU    Fatmata Srivastava MD  Ob/Gyn PGY-2  18 1:00 PM

## 2018-08-13 NOTE — ANESTHESIA PROCEDURE NOTES
Spinal/LP Procedure Note    Spinal Block  Staff:     Anesthesiologist:  BRITNEY PENG    Resident/CRNA:  DANNIE DE LA GARZA    Spinal/LP performed by resident/CRNA in presence of a teaching physician.    Location: OB  Procedure Start/Stop Times:      8/13/2018 11:30 AM     8/13/2018 11:37 AM    patient identified, IV checked, site marked, risks and benefits discussed, informed consent, monitors and equipment checked, pre-op evaluation, at physician/surgeon's request and post-op pain management      Correct Patient: Yes      Correct Position: Yes      Correct Site: Yes      Correct Procedure: Yes      Correct Laterality:  Yes    Site Marked:  Yes  Procedure:     Procedure:  Intrathecal    ASA:  2    Position:  Sitting    Sterile Prep: chloraprep, mask, sterile gloves and patient draped      Insertion site:  L4-5    Approach:  Midline    Needle Type:  Jess    Needle gauge (G):  25    Local Skin Infiltration:  1% lidocaine    amount (ml):  3    Needle Length (in):  3.5    Introducer used: Yes      Introducer gauge:  20 G    Attempts:  1    Redirects:  0    CSF:  Clear    Paresthesias:  No    Time injected:  11:35  Assessment/Narrative:     Sensory Level:  T4

## 2018-08-13 NOTE — PROGRESS NOTES
In PACU with KACY Ames RN for orientation.  See Adia's documentation as well regarding assessments and transfer note.  CAITLIN Booth received transfer report from CAITLIN Cheek.  SQBL while in the OR = 936 and PACU/transfer pad weight= 74 g.  Reported to CAITLIN Booth at transfer.  [place] is in the room with parents.

## 2018-08-13 NOTE — PROVIDER NOTIFICATION
08/13/18 0837   Provider Notification   Provider Name/Title Dr Srivastava   Method of Notification Electronic Page   Request Evaluate in Person   Notification Reason Patient Arrived   pt arrived for 1030 repeat c/section.  Pt wants to discuss tubal.  S&H orders released.

## 2018-08-13 NOTE — IP AVS SNAPSHOT
[facility]    [address] [address]    Phone:  [phone]                                       After Visit Summary   8/13/2018    Adenike Chavez    MRN: [id number]           After Visit Summary Signature Page     I have received my discharge instructions, and my questions have been answered. I have discussed any challenges I see with this plan with the nurse or doctor.    ..........................................................................................................................................  Patient/Patient Representative Signature      ..........................................................................................................................................  Patient Representative Print Name and Relationship to Patient    ..................................................               ................................................  Date                                            Time    ..........................................................................................................................................  Reviewed by Signature/Title    ...................................................              ..............................................  Date                                                            Time

## 2018-08-13 NOTE — H&P
[facility]  OB History and Physical      Adenike Chavez MRN# [id number]   Age: 32 year old YOB: 1985     CC:  Scheduled  section    HPI:  Ms. Adenike Cahvez is a 32 year old  at 39w0d by 6w4d US not c/w LMP, who presents for scheduled  section.  She denies contractions, vaginal bleeding, and loss of fluid.   + normal fetal movement. She reports feeling well today.     Pregnancy Complications:  1. H/o cesearean deliveyr x2  2. GBS+  3. Failed GCT, passed GTT    Prenatal Labs:   Lab Results   Component Value Date    ABO B 2018    RH Pos 2018    AS Neg 2018    HEPBANG Nonreactive 2017    CHPCRT Negative 2018    GCPCRT Negative 2018    TREPAB Negative 2017    RUBELLAABIGG 38 2010    HGB 11.9 2018    HIV Negative 2010       GBS Status:   Lab Results   Component Value Date    GBS Positive (A) 2018       Ultrasounds  1. Dating US 6w4d  2. Anatomy US at 20w4d with normal anatomy, placenta posterior  3. F/u Anatomy scan at 24w4d    OB History  Obstetric History       T2      L2     SAB0   TAB0   Ectopic0   Multiple0   Live Births2       # Outcome Date GA Lbr Lorne/2nd Weight Sex Delivery Anes PTL Lv   3 Current            2 Term 14 39w0d / 05:38 3.487 kg (7 lb 11 oz) M CS-LTranv EPI  RAQUEL      Apgar1:  9               Apgar5: 10   1 Term 09/12/10 39w5d  3.515 kg (7 lb 12 oz) M -SEC   RAQUEL      Name: jennie      Apgar1:  8                Apgar5: 9      Obstetric Comments   No GDM, HTN, or PPH.       PMHx:   Past Medical History:   Diagnosis Date     NO ACTIVE PROBLEMS      PSHx:   Past Surgical History:   Procedure Laterality Date     C/SECTION, LOW TRANSVERSE  9/12/10    fail to dilate - malposition      SECTION N/A 2014    Procedure:  SECTION;  Surgeon: Tyra Richardson MD;  Location: [facility] L+D     HC TOOTH EXTRACTION W/FORCEP      wisdom - age 21 years  old     Meds:   PNV  Zantac  Tums    Allergies:  No Known Allergies   FmHx:   Family History   Problem Relation Age of Onset     Hypertension Father      stroke     Genitourinary Problems Maternal Grandmother      kidney      SocHx: She denies any tobacco, alcohol, or other drug use during this pregnancy.    ROS:   Complete 10-point ROS negative except as noted in HPI.She denies headache, blurry vision, chest pain, shortness of breath, RUQ pain, nausea, vomiting, dysuria, hematuria or extremity edema.    PE:  Vit:   Patient Vitals for the past 4 hrs:   BP Temp Temp src Pulse Resp Height Weight   18 0848 122/76 98.4  F (36.9  C) Oral 95 16 1.524 m (5') 86.5 kg (190 lb 11.2 oz)      Gen: Well-appearing, NAD, comfortable   CV: RRR, no mrg   Pulm: CTAB, no wheezes or crackles   Abd: Soft, gravid, non-tender, EFW 7.5# by [place]  Ext: Trace LE edema b/l  Cx: Deferred    Pres:  Cephalic by BSUS  Memb: intact    FHT: Baseline 120, moderate variability, 15x15 accelerations present, no decelerations   [place]: Rare ctx    Assessment  Ms. Adenike Chavez is a 32 year old  at 39w0d by 6w4d US not c/w LMP, who presents for scheduled  section with bilateral tubal ligation.    Plan  Admit to L&D for scheduled   section. The risks, benefits, and alternatives of  section were discussed, including the risks of bleeding, infection, injury to surrounding organs, fetal injury, and remote risks of hysterectomy. She consented to a blood transfusion in the event of a life threatening amount of bleeding. She had time to ask questions and agreed to proceed. Surgical consent was signed.    Pain:  Spinal per anesthesia.   ID:  Ancef for mayela-op antibiotics.  FWB: Category I FHT.  Continue EFM.   PNC: Rh pos, Rubella immune, GBS positive, GCT failed, GTT passed, Placenta posterior  Fen/GI: NPO, IVFs.  PPH ppx: CBC, T&S.     The patient was discussed with Dr. Harkins who is in agreement with the treatment plan.    #  Pain Assessment:  Current Pain Score 2018   Patient currently in pain? denies   Pain location -   Pain descriptors -   - Adenike is experiencing pain due to  section. Pain management was discussed and the plan was created in a collaborative fashion.  Adenike's response to the current recommendations: engaged  - Please see the plan for pain management as documented above    Fatmata Srivastava MD  Ob/Gyn PGY-2  18 10:59 AM       [facility] staff:  Appreciate note by Dr. Srivastava.  I have seen and examined the patient without the resident. I have reviewed, edited, and agree with the note.    Proceed with repeat CD. Has decided against BTL at this time.     Darby Harkins MD  2018  11:24 AM

## 2018-08-13 NOTE — IP AVS SNAPSHOT
MRN:[id number]                      After Visit Summary   2018    Adenike Chavez    MRN: [id number]           Thank you!     Thank you for choosing [facility] for your care. Our goal is always to provide you with excellent care. Hearing back from our patients is one way we can continue to improve our services. Please take a few minutes to complete the written survey that you may receive in the mail after you visit with us. Thank you!        Patient Information     Date Of Birth          1985        Designated Caregiver       Most Recent Value    Caregiver    Will someone help with your care after discharge? no      About your hospital stay     You were admitted on:  2018 You last received care in the:  [facility]    You were discharged on:  2018        Reason for your hospital stay       Maternity care                  Who to Call     For medical emergencies, please call 911.  For non-urgent questions about your medical care, please call your primary care provider or clinic, None  For questions related to your surgery, please call your surgery clinic        Attending Provider     Provider Specialty    Darby Harkins MD OB/Gyn       Primary Care Provider Fax #    Physician No Ref-Primary [phone]      After Care Instructions     Activity       Review discharge instructions            Diet       Resume previous diet            Discharge Instructions - Postpartum visit       Schedule postpartum visit with your provider and return to clinic in 6 weeks.    Activity Instructions:   -  delivery: No lifting more than 15 pounds for 6 weeks.  Nothing in the vagina for 6 weeks, no intercourse for 6 weeks. No strenuous exercise for 6 weeks. No driving until you have stopped taking your pain medications.      Call your health care provider if you have any of the following: Fever above 100.4 F; opening or drainage from your incision; soaking a sanitary pad with blood  within 1 hour, or you see blood clots larger than a golf ball; malodorous vaginal discharge, severe or worsening pain uncontrolled by your pain medications, nausea and vomiting, severe headaches, changes in vision, calf swelling or pain, shortness of breath, problems coping with sadness, anxiety, or depression.  If you have any concerns about hurting yourself or the baby, call your provider immediately. You are encouraged to call with questions or concerns after you return home.                  Follow-up Appointments     Follow Up and recommended labs and tests       Follow-up in 6 weeks for postpartum visit                  Further instructions from your care team       Postop  Birth Instructions    Activity       Do not lift more than 10 pounds for 6 weeks after surgery.  Ask family and friends for help when you need it.    No driving until you have stopped taking your pain medications (usually two weeks after surgery).    No heavy exercise or activity for 6 weeks.  Don't do anything that will put a strain on your surgery site.    Don't strain when using the toilet.  Your care team may prescribe a stool softener if you have problems with your bowel movements.     To care for your incision:       Keep the incision clean and dry.    Do not soak your incision in water. No swimming or hot tubs until it has fully healed. You may soak in the bathtub if the water level is below your incision.    Do not use peroxide, gel, cream, lotion, or ointment on your incision.    Adjust your clothes to avoid pressure on your surgery site (check the elastic in your underwear for example).     You may see a small amount of clear or pink drainage and this is normal.  Check with your health care provider:       If the drainage increases or has an odor.    If the incision reddens, you have swelling, or develop a rash.    If you have increased pain and the medicine we prescribed doesn't help.    If you have a fever above 100.4 F  (38 C) with or without chills when placing thermometer under your tongue.   The area around your incision (surgery wound), will feel numb.  This is normal. The numbness should go away in less than a year.     Keep your hands clean:  Always wash your hands before touching your incision (surgery wound). This helps reduce your risk of infection. If your hands aren't dirty, you may use an alcohol hand-rub to clean your hands. Keep your nails clean and short.    Call your healthcare provider if you have any of these symptoms:       You soak a sanitary pad with blood within 1 hour, or you see blood clots larger than a golf ball.    Bleeding that lasts more than 6 weeks.    Vaginal discharge that smells bad.    Severe pain, cramping or tenderness in your lower belly area.    A need to urinate more frequently (use the toilet more often), more urgently (use the toilet very quickly), or it burns when you urinate.    Nausea and vomiting.    Redness, swelling or pain around a vein in your leg.    Problems breastfeeding or a red or painful area on your breast.    Chest pain and cough or are gasping for air.    Problems with coping with sadness, anxiety or depression. If you have concerns about hurting yourself or the baby, call your provider immediately.      You have questions or concerns after you return home.                  Pending Results     No orders found from 8/11/2018 to 8/14/2018.            Statement of Approval     Ordered          08/16/18 1047  I have reviewed and agree with all the recommendations and orders detailed in this document.  EFFECTIVE NOW     Approved and electronically signed by:  Tyra Parham MD           08/16/18 1047  I have reviewed and agree with all the recommendations and orders detailed in this document.  EFFECTIVE NOW     Approved and electronically signed by:  Tyra Parham MD             Admission Information     Date & Time Provider Department Dept. Phone    8/13/2018 Shahana  Darby Silva MD [facility] [phone]      Your Vitals Were     Blood Pressure Pulse Temperature Respirations Height Weight    139/77 77 98.5  F (36.9  C) (Oral) 16 1.524 m (5') 86.5 kg (190 lb 11.2 oz)    Last Period Pulse Oximetry BMI (Body Mass Index)             10/25/2017 95% 37.24 kg/m2         TRADE TO [facility] Information     [employer] gives you secure access to your electronic health record. If you see a primary care provider, you can also send messages to your care team and make appointments. If you have questions, please call your primary care clinic.  If you do not have a primary care provider, please call [phone] and they will assist you.        Care EveryWhere ID     This is your Care EveryWhere ID. This could be used by other organizations to access your [facility] medical records  [id number]        Equal Access to Services     DUANE SAMPSON : Irwin Marc, efrain shabazz, micaela tenorio, lulu gonzalez . So [facility] [phone].    ATENCIÓN: Si habla español, tiene a burleson disposición servicios gratuitos de asistencia lingüística. Llame al [phone].    We comply with applicable federal civil rights laws and [place] laws. We do not discriminate on the basis of race, color, national origin, age, disability, sex, sexual orientation, or gender identity.               Review of your medicines      START taking        Dose / Directions    acetaminophen 325 MG tablet   Commonly known as:  TYLENOL        Dose:  650 mg   Take 2 tablets (650 mg) by mouth every 6 hours as needed for mild pain   Quantity:  100 tablet   Refills:  0       ferrous sulfate 325 (65 Fe) MG tablet   Commonly known as:  IRON   Used for:  Anemia due to blood loss, acute        Dose:  325 mg   Take 1 tablet (325 mg) by mouth daily with food   Quantity:  90 tablet   Refills:  0       ibuprofen 600 MG tablet   Commonly known as:  ADVIL/MOTRIN        Dose:  600 mg   Take 1 tablet (600 mg) by  mouth every 6 hours as needed for other (carmping)   Quantity:  100 tablet   Refills:  0       oxyCODONE IR 5 MG tablet   Commonly known as:  ROXICODONE        Dose:  5-10 mg   Take 1-2 tablets (5-10 mg) by mouth every 3 hours as needed for other (pain control or improvement in physical function. Hold dose for analgesic side effects.)   Quantity:  20 tablet   Refills:  0       senna-docusate 8.6-50 MG per tablet   Commonly known as:  SENOKOT-S;PERICOLACE        Dose:  1 tablet   Take 1 tablet by mouth 2 times daily as needed for constipation   Quantity:  100 tablet   Refills:  0         CONTINUE these medicines which have NOT CHANGED        Dose / Directions    breast pump Misc   Used for:  Supervision of high risk pregnancy in third trimester        Dose:  1 each   1 each as needed   Quantity:  1 each   Refills:  0       calcium carbonate 500 MG chewable tablet   Commonly known as:  TUMS        Dose:  1 chew tab   Take 1 chew tab by mouth daily   Refills:  0       prenatal multivitamin plus iron 27-0.8 MG Tabs per tablet        Dose:  1 tablet   Take 1 tablet by mouth daily   Refills:  0       ZANTAC PO        Refills:  0            Where to get your medicines      These medications were sent to [facility] [place], [place] - [address]  [address] [address]     Phone:  [phone]     acetaminophen 325 MG tablet    ferrous sulfate 325 (65 Fe) MG tablet    ibuprofen 600 MG tablet    senna-docusate 8.6-50 MG per tablet         Some of these will need a paper prescription and others can be bought over the counter. Ask your nurse if you have questions.     Bring a paper prescription for each of these medications     oxyCODONE IR 5 MG tablet                Protect others around you: Learn how to safely use, store and throw away your medicines at www.disposemymeds.org.        Information about your nerve block     Today you received a block to numb the nerves near your  "surgery site.    This is a block using local anesthetic or \"numbing\" medication injected around the nerves to anesthetize or \"numb\" the area supplied by those nerves. This block is injected into the muscle layer near your surgical site. The type of anesthesia (Exparel) your anesthesia team used to numb your abdomen may give you relief for up to 72 hours.     Diet: There are no diet restrictions, but you should drink plenty of fluids, unless you are on a fluid-restricted diet.     Activity: If your surgical site is an arm or leg you should be careful with your affected limb, since it is possible to injure your limb without being aware of it due to the numbing. Until full feeling returns, you should guard against bumping or hitting your limb, and avoid extreme hot or cold temperatures on the skin.    Pain Medication: As the block wears off, the feeling will return as a tingling or prickly sensation near your surgical site. You will experience more discomfort from your incisions as the feeling returns. You may want to take a pain pill (a narcotic or Tylenol if this was prescribed by your surgeon) when you start to experience mild pain, before the pain becomes more severe. If your pain medications do not control your pain, you should notify your surgeon. If you are taking narcotics for pain management, do not drink alcohol, drive a car, or perform hazardous activities.  If you have questions or concerns you may call your surgeon at the number provided with your discharge instructions.     Call your surgeon if you experience blurry vision, ringing in the ears or metallic taste in your mouth.         Information about OPIOIDS     PRESCRIPTION OPIOIDS: WHAT YOU NEED TO KNOW   We gave you an opioid (narcotic) pain medicine. It is important to manage your pain, but opioids are not always the best choice. You should first try all the other options your care team gave you. Take this medicine for as short a time (and as few " doses) as possible.    Some activities can increase your pain, such as bandage changes or therapy sessions. It may help to take your pain medicine 30 to 60 minutes before these activities. Reduce your stress by getting enough sleep, working on hobbies you enjoy and practicing relaxation or meditation. Talk to your care team about ways to manage your pain beyond prescription opioids.    These medicines have risks:    DO NOT drive when on new or higher doses of pain medicine. These medicines can affect your alertness and reaction times, and you could be arrested for driving under the influence (DUI). If you need to use opioids long-term, talk to your care team about driving.    DO NOT operate heavy machinery    DO NOT do any other dangerous activities while taking these medicines.    DO NOT drink any alcohol while taking these medicines.     If the opioid prescribed includes acetaminophen, DO NOT take with any other medicines that contain acetaminophen. Read all labels carefully. Look for the word  acetaminophen  or  Tylenol.  Ask your pharmacist if you have questions or are unsure.    You can get addicted to pain medicines, especially if you have a history of addiction (chemical, alcohol or substance dependence). Talk to your care team about ways to reduce this risk.    All opioids tend to cause constipation. Drink plenty of water and eat foods that have a lot of fiber, such as fruits, vegetables, prune juice, apple juice and high-fiber cereal. Take a laxative (Miralax, milk of magnesia, Colace, Senna) if you don t move your bowels at least every other day. Other side effects include upset stomach, sleepiness, dizziness, throwing up, tolerance (needing more of the medicine to have the same effect), physical dependence and slowed breathing.    Store your pills in a secure place, locked if possible. We will not replace any lost or stolen medicine. If you don t finish your medicine, please throw away (dispose) as  directed by your pharmacist. The [place] Pollution [employer] has more information about safe disposal: https://www.pca.state.[place]/living-green/managing-unwanted-medications             Medication List: This is a list of all your medications and when to take them. Check marks below indicate your daily home schedule. Keep this list as a reference.      Medications           Morning Afternoon Evening Bedtime As Needed    acetaminophen 325 MG tablet   Commonly known as:  TYLENOL   Take 2 tablets (650 mg) by mouth every 6 hours as needed for mild pain   Last time this was given:  975 mg on 8/16/2018  3:38 AM                                breast pump Misc   1 each as needed                                calcium carbonate 500 MG chewable tablet   Commonly known as:  TUMS   Take 1 chew tab by mouth daily                                ferrous sulfate 325 (65 Fe) MG tablet   Commonly known as:  IRON   Take 1 tablet (325 mg) by mouth daily with food                                ibuprofen 600 MG tablet   Commonly known as:  ADVIL/MOTRIN   Take 1 tablet (600 mg) by mouth every 6 hours as needed for other (carmping)   Last time this was given:  800 mg on 8/16/2018  8:25 AM                                oxyCODONE IR 5 MG tablet   Commonly known as:  ROXICODONE   Take 1-2 tablets (5-10 mg) by mouth every 3 hours as needed for other (pain control or improvement in physical function. Hold dose for analgesic side effects.)   Last time this was given:  10 mg on 8/16/2018  8:25 AM                                prenatal multivitamin plus iron 27-0.8 MG Tabs per tablet   Take 1 tablet by mouth daily                                senna-docusate 8.6-50 MG per tablet   Commonly known as:  SENOKOT-S;PERICOLACE   Take 1 tablet by mouth 2 times daily as needed for constipation   Last time this was given:  2 tablets on 8/16/2018  8:25 AM                                ZANTAC PO

## 2018-08-13 NOTE — ANESTHESIA PREPROCEDURE EVALUATION
Anesthesia Evaluation     . Pt has had prior anesthetic. Type: General and MAC    History of anesthetic complications    Epidural converted to GA for inadequate converge during previous       ROS/MED HX    ENT/Pulmonary:  - neg pulmonary ROS     Neurologic:  - neg neurologic ROS     Cardiovascular:  - neg cardiovascular ROS       METS/Exercise Tolerance:     Hematologic:  - neg hematologic  ROS       Musculoskeletal:  - neg musculoskeletal ROS       GI/Hepatic:  - neg GI/hepatic ROS       Renal/Genitourinary:  - ROS Renal section negative       Endo:  - neg endo ROS       Psychiatric:  - neg psychiatric ROS       Infectious Disease:  - neg infectious disease ROS       Malignancy:      - no malignancy   Other:    (+) Possibly pregnant C-spine cleared: Yes, no H/O Chronic Pain,no other significant disability                                   Anesthesia Plan      History & Physical Review  History and physical reviewed and following examination; no interval change.    ASA Status:  2 .        Plan for Spinal and Periph. Nerve Block for postop pain   PONV prophylaxis:  Ondansetron (or other 5HT-3)    Adenike Chavez is a 32 year old female with a history of  x 2 most recently who presents for repeat C section and tubal ligation. Spinal with post-op TAP block for pain control. GA as backup.             Postoperative Care  Postoperative pain management:  IV analgesics, Peripheral nerve block (Single Shot) and Neuraxial analgesia.      Consents  Anesthetic plan, risks, benefits and alternatives discussed with:  Patient..        I have personally seen and evaluated patient.  Agree with above assessment and plan as documented by anesthesia resident.      Alisha Duggan MD  2018

## 2018-08-13 NOTE — ANESTHESIA CARE TRANSFER NOTE
Patient: Adenike Chavez    Procedure(s):  Repeat  Section, Tubal Ligation  - Wound Class: II-Clean Contaminated    Diagnosis: Previous  Section, Desires Permanent Sterilization   Diagnosis Additional Information: No value filed.    Anesthesia Type:   Spinal, Periph. Nerve Block for postop pain     Note:    Patient transferred to:Labor and Delivery  Handoff Report: Identifed the Patient, Identified the Reponsible Provider, Reviewed the pertinent medical history, Discussed the surgical course, Reviewed Intra-OP anesthesia mangement and issues during anesthesia, Set expectations for post-procedure period and Allowed opportunity for questions and acknowledgement of understanding      Vitals: (Last set prior to Anesthesia Care Transfer)    CRNA VITALS  2018 1229 - 2018 1310      2018             Pulse: 81    SpO2: 96 %                Electronically Signed By: MAGDA Barrientos CRNA  2018  1:10 PM

## 2018-08-13 NOTE — DISCHARGE SUMMARY
[facility] Discharge Summary    Adenike Chavez MRN# [id number]   Age: 32 year old YOB: 1985     Date of Admission:  2018  Date of Discharge::  18   Admitting Physician:  Darby Harkins MD  Discharge Physician:  Tyra Parham MD             Admission Diagnoses:   Intrauterine pregnancy at 39w0d  History of  section x2  GBS+  Failed GCT, passed GTT          Discharge Diagnosis:   Same, delivered             Procedures:   Procedure(s): Repeat low transverse  section with double layer closure via Pfannenstiel skin incision  Spinal anesthesia                Medications Prior to Admission:     Prescriptions Prior to Admission   Medication Sig Dispense Refill Last Dose     calcium carbonate (TUMS) 500 MG chewable tablet Take 1 chew tab by mouth daily   2018 at Unknown time     Prenatal Vit-Fe Fumarate-FA (PRENATAL MULTIVITAMIN PLUS IRON) 27-0.8 MG TABS per tablet Take 1 tablet by mouth daily   Past Month at Unknown time     RaNITidine HCl (ZANTAC PO)    2018 at Unknown time     Misc. Devices (BREAST PUMP) MISC 1 each as needed 1 each 0 Unknown at Unknown time             Discharge Medications:        Review of your medicines      START taking       Dose / Directions    acetaminophen 325 MG tablet   Commonly known as:  TYLENOL        Dose:  650 mg   Take 2 tablets (650 mg) by mouth every 6 hours as needed for mild pain   Quantity:  100 tablet   Refills:  0       ferrous sulfate 325 (65 Fe) MG tablet   Commonly known as:  IRON   Used for:  Anemia due to blood loss, acute        Dose:  325 mg   Take 1 tablet (325 mg) by mouth daily with food   Quantity:  90 tablet   Refills:  0       ibuprofen 600 MG tablet   Commonly known as:  ADVIL/MOTRIN        Dose:  600 mg   Take 1 tablet (600 mg) by mouth every 6 hours as needed for other (carmping)   Quantity:  100 tablet   Refills:  0       oxyCODONE IR 5 MG tablet   Commonly known as:  ROXICODONE        Dose:  5-10 mg   Take  1-2 tablets (5-10 mg) by mouth every 3 hours as needed for other (pain control or improvement in physical function. Hold dose for analgesic side effects.)   Quantity:  20 tablet   Refills:  0       senna-docusate 8.6-50 MG per tablet   Commonly known as:  SENOKOT-S;PERICOLACE        Dose:  1 tablet   Take 1 tablet by mouth 2 times daily as needed for constipation   Quantity:  100 tablet   Refills:  0         CONTINUE these medicines which have NOT CHANGED       Dose / Directions    breast pump Misc   Used for:  Supervision of high risk pregnancy in third trimester        Dose:  1 each   1 each as needed   Quantity:  1 each   Refills:  0       calcium carbonate 500 MG chewable tablet   Commonly known as:  TUMS        Dose:  1 chew tab   Take 1 chew tab by mouth daily   Refills:  0       prenatal multivitamin plus iron 27-0.8 MG Tabs per tablet        Dose:  1 tablet   Take 1 tablet by mouth daily   Refills:  0       ZANTAC PO        Refills:  0            Where to get your medicines      These medications were sent to [facility] [address]  [address] [address]     Phone:  [phone]      acetaminophen 325 MG tablet     ferrous sulfate 325 (65 Fe) MG tablet     ibuprofen 600 MG tablet     senna-docusate 8.6-50 MG per tablet         Some of these will need a paper prescription and others can be bought over the counter. Ask your nurse if you have questions.     Bring a paper prescription for each of these medications      oxyCODONE IR 5 MG tablet                     Consultations:   Lactation          Brief Admission History:   Ms. Adenike Chavez is a 32 year old  at 39w0d by 6w4d US not c/w LMP, who presents for scheduled  section.  She denies contractions, vaginal bleeding, and loss of fluid.   + normal fetal movement. She reports feeling well today.        Intraoperative course   The procedure was uncomplicated.   mL.  See operative  report for details.     Operative findings:   - Single, viable female infant at 1204 hours on 2018. Apgars of 9 and 9 at one and five minutes.  Birth weight: 3620 g.  Fetal presentation: CHASE. Amniotic fluid: clear.    - Placenta intact with 3 vessel cord.     - Normal appearing uterus, fallopian tubes, ovaries.   - Moderate recto-fascial adhesions. No abdominal wall adhesions       Postpartum Course   The patient's hospital course was unremarkable.  She recovered as anticipated and experienced no post-operative complications. On discharge, her pain was well controlled. Vaginal bleeding is present and light. Voiding without difficulty.  Ambulating well, tolerating a normal diet, and has flatus.  No fever or significant wound drainage.  Breastfeeding well.  Infant is stable. She was discharged on post-partum day #3.    Post-partum hemoglobin:   Hemoglobin   Date Value Ref Range Status   2018 9.6 (L) 11.7 - 15.7 g/dL Final     Contraception: Paraguard IUD  Rh positive, no Rhogam indicated  Rubella immune, no MMR indicated          Discharge Instructions and Follow-Up:   Discharge diet: Regular   Discharge activity: No lifting greater than 20 lbs, pushing, pulling, or other strenuous activity for 6 weeks. Pelvic rest for 6 weeks including no sexual intercourse, tampons, or douching. No driving until you can slam on the breaks without pain or while on narcotic pain medications.    Discharge follow-up: Follow up with primary OB for routine postpartum visit in 6 weeks   Wound care: Keep incision clean and dry           Discharge Disposition:   Discharged to home      # Discharge Pain Plan:   - During her hospitalization, Adenike experienced pain due to  section.  The pain plan for discharge was discussed with Adenike and the plan was created in a collaborative fashion.    - Opioids prescribed on discharge: Oxycodone 5 mg q4h  - Duration of opioids after discharge: Per CDC opioid prescribing guidelines, a 3  day prescription of opioids was provided.  - Bowel regimen: senna  - Pharmacologic adjuvants:  NSAIDs and Acetaminophen    Fatmata Srivastava MD  Ob/Gyn PGY-2  08/16/18 6:09 AM      Appreciate Dr. Srivastava's summary above, patient also seen and examined by me on the day of discharge. I agree with the summary above.   Tyra Parham MD

## 2018-08-13 NOTE — PLAN OF CARE
Problem: Patient Care Overview  Goal: Plan of Care/Patient Progress Review  Outcome: Therapy, progress toward functional goals as expected  Mother and baby transferred to postpartum unit at 7125 via cart with baby in mother's arms after completion of immediate recovery period. Patient oriented to room and report given to CAITLIN Booth who assumes patient care. Mother and baby bonding well and in stable condition upon transfer. Bands verified with CAITLIN Booth and found to be matching between pt/FOB/baby.

## 2018-08-13 NOTE — PLAN OF CARE
Problem: Patient Care Overview  Goal: Plan of Care/Patient Progress Review  Outcome: No Change  Pt here at 0830 for scheduled repeat c/section and tubal.  Pt wanted to discuss tubal with providers as now uncertain.  Dr. Srivastava notified and Dr. Harkins notified and in room to discuss with pt and her spouse.  Repeat c/section consent signed.  Pt does not want a tubal at this time.  VSS.  No ctxs per pt.  occassional ctx per toco.   mod with accels and no decels.  Reactive NST.  No vaginal bleeding.  Intact.  Prep completed.  Pt off monitor.  Waiting for OR availability.  Seen by providers.  Tap Block consent signed.  Pt consented to a research study with Dr. Mares.  1 red tube collected with IV start and given to Dr. Mares.  Plan for Dr Harkins to collect a uterine segment in the OR for the study.  Girish - spouse will be in the OR with the pt.  Call light is within reach.  Pt reported carpal tunnel bilat, edema in legs - more in left than right and BMs q 2 days throughout pregnancy.  No changes in condition.

## 2018-08-13 NOTE — PLAN OF CARE
Patient arrived to [facility] unit via zoom cart at 1525,with belongings, accompanied by spouse/ significant other, with infant in arms. Received report from Adia WATERS RN and checked bands. Unit and room orientation started. Call light given and within arms reach; no concerns present at this time. Continue with plan of care.

## 2018-08-13 NOTE — PROVIDER NOTIFICATION
08/13/18 1346   Provider Notification   Provider Name/Title dr diaz   Method of Notification Electronic Page   please place post partum orders

## 2018-08-13 NOTE — PROGRESS NOTES
In OB[facility], Dr Harkins collected a uterine sample for the uterine contractiability research study.  Specimen given to Dr Mares.

## 2018-08-14 LAB — HGB BLD-MCNC: 9.6 G/DL (ref 11.7–15.7)

## 2018-08-14 PROCEDURE — 85018 HEMOGLOBIN: CPT | Performed by: OBSTETRICS & GYNECOLOGY

## 2018-08-14 PROCEDURE — 25000132 ZZH RX MED GY IP 250 OP 250 PS 637: Performed by: OBSTETRICS & GYNECOLOGY

## 2018-08-14 PROCEDURE — 25000132 ZZH RX MED GY IP 250 OP 250 PS 637: Performed by: STUDENT IN AN ORGANIZED HEALTH CARE EDUCATION/TRAINING PROGRAM

## 2018-08-14 PROCEDURE — 25000128 H RX IP 250 OP 636: Performed by: OBSTETRICS & GYNECOLOGY

## 2018-08-14 PROCEDURE — 12000030 ZZH R&B OB INTERMEDIATE UMMC

## 2018-08-14 PROCEDURE — 36415 COLL VENOUS BLD VENIPUNCTURE: CPT | Performed by: OBSTETRICS & GYNECOLOGY

## 2018-08-14 RX ADMIN — ACETAMINOPHEN 975 MG: 325 TABLET, FILM COATED ORAL at 10:35

## 2018-08-14 RX ADMIN — SIMETHICONE CHEW TAB 80 MG 80 MG: 80 TABLET ORAL at 07:43

## 2018-08-14 RX ADMIN — ACETAMINOPHEN 975 MG: 325 TABLET, FILM COATED ORAL at 02:47

## 2018-08-14 RX ADMIN — OXYCODONE HYDROCHLORIDE 5 MG: 5 TABLET ORAL at 09:19

## 2018-08-14 RX ADMIN — IBUPROFEN 800 MG: 800 TABLET, FILM COATED ORAL at 19:24

## 2018-08-14 RX ADMIN — IBUPROFEN 800 MG: 800 TABLET, FILM COATED ORAL at 12:51

## 2018-08-14 RX ADMIN — SENNOSIDES AND DOCUSATE SODIUM 2 TABLET: 8.6; 5 TABLET ORAL at 07:43

## 2018-08-14 RX ADMIN — SENNOSIDES AND DOCUSATE SODIUM 2 TABLET: 8.6; 5 TABLET ORAL at 19:24

## 2018-08-14 RX ADMIN — SIMETHICONE CHEW TAB 80 MG 80 MG: 80 TABLET ORAL at 16:32

## 2018-08-14 RX ADMIN — ACETAMINOPHEN 975 MG: 325 TABLET, FILM COATED ORAL at 18:37

## 2018-08-14 RX ADMIN — OXYCODONE HYDROCHLORIDE 5 MG: 5 TABLET ORAL at 23:59

## 2018-08-14 RX ADMIN — OXYCODONE HYDROCHLORIDE 5 MG: 5 TABLET ORAL at 20:59

## 2018-08-14 RX ADMIN — OXYCODONE HYDROCHLORIDE 5 MG: 5 TABLET ORAL at 12:51

## 2018-08-14 RX ADMIN — OXYCODONE HYDROCHLORIDE 5 MG: 5 TABLET ORAL at 00:56

## 2018-08-14 RX ADMIN — OXYCODONE HYDROCHLORIDE 5 MG: 5 TABLET ORAL at 04:24

## 2018-08-14 RX ADMIN — OXYCODONE HYDROCHLORIDE 10 MG: 5 TABLET ORAL at 16:32

## 2018-08-14 RX ADMIN — KETOROLAC TROMETHAMINE 30 MG: 30 INJECTION, SOLUTION INTRAMUSCULAR at 00:55

## 2018-08-14 RX ADMIN — KETOROLAC TROMETHAMINE 30 MG: 30 INJECTION, SOLUTION INTRAMUSCULAR at 07:43

## 2018-08-14 NOTE — ANESTHESIA PROCEDURE NOTES
Peripheral Nerve Block Procedure Note    Staff:     Anesthesiologist:  BRITNEY PENG  Location: OB and PACU  Procedure Start/Stop TImes:     patient identified, IV checked, site marked, risks and benefits discussed, informed consent, monitors and equipment checked, pre-op evaluation, at physician/surgeon's request and post-op pain management      Correct Patient: Yes      Correct Position: Yes      Correct Site: Yes      Correct Procedure: Yes      Correct Laterality:  Yes    Site Marked:  Yes  Procedure details:     Procedure:  TAP    Laterality:  Bilateral    Position:  Supine    Sterile Prep: chloraprep      Local skin infiltration:  None    Needle:  Insulated and short bevel    Needle gauge:  22    Needle length (inches):  4    Ultrasound: Yes      Ultrasound used to identify targeted nerve, plexus, or vascular structure and placed a needle adjacent to it      Permanent Image entered into patiient's record      Abnormal pain on injection: No      Blood Aspirated: No      Paresthesias:  No    Bleeding at site: No      Bolus via:  Needle    Infusion Method:  Single Shot    Complications:  None  Assessment/Narrative:     Injection made incrementally with aspirations every (mL):  5

## 2018-08-14 NOTE — PLAN OF CARE
Problem: Patient Care Overview  Goal: Plan of Care/Patient Progress Review  Outcome: Improving  Patients vitals have been stable. Postpartum assessments have been WDL. Declines headache, dizziness, and blurred vision. Andino was putting out adequate amounts of urine. Andino discontinued at 0530 this morning and IV saline locked at that time as well. Patient has been able to get up the bathroom with stand by assist twice overnight. Fundus is firm with scant to small amount of lochia. Incision dressing is clean, dry, and intact. Is taking Toradol, tylenol, and 5 mg of oxycodone for pain. Will continue to monitor for adequate pain control and await first void since catheter removal.

## 2018-08-14 NOTE — PLAN OF CARE
Problem: Patient Care Overview  Goal: Plan of Care/Patient Progress Review  Outcome: Improving  Stable, has mild incisional pain and medicated, comfortable as claimed. Able to tolerate food brought from home. Encouraged to increase fluid intake.  Will continue with plan of care.

## 2018-08-14 NOTE — ANESTHESIA POSTPROCEDURE EVALUATION
Patient: Adenike Chavez    Procedure(s):  Repeat  Section, Tubal Ligation  - Wound Class: II-Clean Contaminated    Diagnosis:Previous  Section, Desires Permanent Sterilization   Diagnosis Additional Information: No value filed.    Anesthesia Type:  Spinal, Periph. Nerve Block for postop pain    Note:  Anesthesia Post Evaluation    Patient location during evaluation: OB PACU  Patient participation: Able to participate in evaluation but full recovery from regional anesthesia has not yet ocurrred but is anticipated to occur within 48 hours  Level of consciousness: awake and alert  Pain management: adequate  Airway patency: patent  Cardiovascular status: acceptable  Respiratory status: acceptable  Hydration status: acceptable  PONV: none             Last vitals:  Vitals:    18 1800 18 1845 18 1932   BP: 116/81 112/78 119/87   Pulse:      Resp: 16 16 16   Temp: 36.7  C (98.1  F)  36.9  C (98.5  F)   SpO2: 98% 98% 99%         Electronically Signed By: Alisha Duggan MD  2018  8:31 PM

## 2018-08-14 NOTE — PLAN OF CARE
Problem: Patient Care Overview  Goal: Plan of Care/Patient Progress Review  Outcome: Improving  Data: Vital signs within normal limits. Postpartum checks within normal limits - see flow record. Patient eating and drinking normally. Patient able to empty bladder independently and is up ambulating. No apparent signs of infection. Incision dry and intact.  healing well. Patient performing self cares and is able to care for infant.  Action: Patient medicated during the shift for uterine. See MAR. Patient reassessed within 1 hour after each medication and pain was improved - patient stated she was comfortable. Patient education done about self care. See flow record.  Response: Positive attachment behaviors observed with infant. Support persons present.   Plan: Anticipate discharge when medically ready.

## 2018-08-14 NOTE — PROGRESS NOTES
Post Partum Progress Note  POD#1  Subjective:  She is resting comfortably in bed this morning. Has noticed increased pain this morning since TAPS blocks have worn off, but states it is tolerable on the current medication regimen. Tolerating PO intake.  Lochia present and is light.  Voiding without difficulty. Ambulating without dizziness or difficulty.  She denies headache, changes in vision, nausea/vomiting, chest pain, shortness of breath, RUQ pain, or worsening edema.  Plans to breastfeed.    Objective:  Vitals:    18 1845 18 1932 18 0056 18 0519   BP: 112/78 119/87 113/73 103/76   Pulse:       Resp: 16 16 16 16   Temp:  98.5  F (36.9  C) 98.6  F (37  C) 98.9  F (37.2  C)   TempSrc:  Oral Oral Oral   SpO2: 98% 99% 96% 95%   Weight:       Height:           General: NAD. A&Ox3.  CV: Regular rate, well perfused  Pulm: Normal respiratory effort.  Abd: Soft, non-tender, non-distended. Fundus is firm and below the umbilicus.    Incision: bandage in place, clean and dry  Ext: Trace edema. No calf tenderness.    # Pain Assessment:  Current Pain Score 2018   Patient currently in pain? yes   Pain location Incision   Pain descriptors Sore   - Adenike is experiencing pain due to  section. Pain management was discussed and the plan was created in a collaborative fashion.  Adenike's response to the current recommendations: engaged  - Opioid regimen: Oxycodone 5-10 mg q4h  - Response to opioid medications: Reduction of symptoms   - Bowel regimen: senna  - Pharmacologic adjuvants: NSAIDs and Acetaminophen    Assessment/Plan:  Adenike Chavez is a 32 year old  female who is POD#1 s/p RLTCS.    - Encourage routine post-operative goals including ambulation and incentive spirometry  - PNC: Rh pos. Rubella immune. No intervention indicated.  - Pain: controlled on oral medications   - Heme: Hgb 11.9> > am pending.  If <10 will discharge home with iron.  - GI: continue anti-emetics and stool  softeners as needed.  - : S/p juares, has not voided yet  - Infant: Stable in room  - Feeding: Plans on breastfeeding.  - BC: Plans on Paraguard    Discharge to home on POD#3    Fatmata Srivastava MD  Ob/Gyn PGY-2  08/14/18 6:10 AM      The patient was seen and examined by me separately from the team.  I have reviewed and agree with the above note.  She is feeling well this afternoon, pain is controlled, ambulating and voiding without difficulty.  She is asymptomatic from her ABLA.  Continue routine post op care.    Hemoglobin   Date Value Ref Range Status   08/14/2018 9.6 (L) 11.7 - 15.7 g/dL Final     Yolis Cabrera MD, FACOG

## 2018-08-15 PROCEDURE — 25000132 ZZH RX MED GY IP 250 OP 250 PS 637: Performed by: STUDENT IN AN ORGANIZED HEALTH CARE EDUCATION/TRAINING PROGRAM

## 2018-08-15 PROCEDURE — 12000028 ZZH R&B OB UMMC

## 2018-08-15 PROCEDURE — 25000132 ZZH RX MED GY IP 250 OP 250 PS 637: Performed by: OBSTETRICS & GYNECOLOGY

## 2018-08-15 RX ORDER — AMOXICILLIN 250 MG
1 CAPSULE ORAL 2 TIMES DAILY PRN
Qty: 100 TABLET | Refills: 0 | Status: SHIPPED | OUTPATIENT
Start: 2018-08-15 | End: 2018-09-25

## 2018-08-15 RX ORDER — OXYCODONE HYDROCHLORIDE 5 MG/1
5-10 TABLET ORAL
Qty: 20 TABLET | Refills: 0 | Status: SHIPPED | OUTPATIENT
Start: 2018-08-15 | End: 2018-09-25

## 2018-08-15 RX ORDER — IBUPROFEN 600 MG/1
600 TABLET, FILM COATED ORAL EVERY 6 HOURS PRN
Qty: 100 TABLET | Refills: 0 | Status: SHIPPED | OUTPATIENT
Start: 2018-08-15 | End: 2018-09-25

## 2018-08-15 RX ORDER — FERROUS SULFATE 325(65) MG
325 TABLET ORAL
Qty: 90 TABLET | Refills: 0 | Status: SHIPPED | OUTPATIENT
Start: 2018-08-15 | End: 2018-09-25

## 2018-08-15 RX ORDER — ACETAMINOPHEN 325 MG/1
650 TABLET ORAL EVERY 6 HOURS PRN
Qty: 100 TABLET | Refills: 0 | Status: SHIPPED | OUTPATIENT
Start: 2018-08-15 | End: 2018-09-25

## 2018-08-15 RX ADMIN — IBUPROFEN 800 MG: 800 TABLET, FILM COATED ORAL at 00:03

## 2018-08-15 RX ADMIN — OXYCODONE HYDROCHLORIDE 5 MG: 5 TABLET ORAL at 14:35

## 2018-08-15 RX ADMIN — OXYCODONE HYDROCHLORIDE 5 MG: 5 TABLET ORAL at 07:12

## 2018-08-15 RX ADMIN — OXYCODONE HYDROCHLORIDE 5 MG: 5 TABLET ORAL at 16:08

## 2018-08-15 RX ADMIN — ACETAMINOPHEN 975 MG: 325 TABLET, FILM COATED ORAL at 11:08

## 2018-08-15 RX ADMIN — OXYCODONE HYDROCHLORIDE 5 MG: 5 TABLET ORAL at 20:48

## 2018-08-15 RX ADMIN — OXYCODONE HYDROCHLORIDE 5 MG: 5 TABLET ORAL at 11:08

## 2018-08-15 RX ADMIN — IBUPROFEN 800 MG: 800 TABLET, FILM COATED ORAL at 07:12

## 2018-08-15 RX ADMIN — SENNOSIDES AND DOCUSATE SODIUM 2 TABLET: 8.6; 5 TABLET ORAL at 11:07

## 2018-08-15 RX ADMIN — IBUPROFEN 800 MG: 800 TABLET, FILM COATED ORAL at 14:35

## 2018-08-15 RX ADMIN — ACETAMINOPHEN 975 MG: 325 TABLET, FILM COATED ORAL at 19:44

## 2018-08-15 RX ADMIN — IBUPROFEN 800 MG: 800 TABLET, FILM COATED ORAL at 20:47

## 2018-08-15 RX ADMIN — OXYCODONE HYDROCHLORIDE 5 MG: 5 TABLET ORAL at 19:44

## 2018-08-15 RX ADMIN — SENNOSIDES AND DOCUSATE SODIUM 2 TABLET: 8.6; 5 TABLET ORAL at 20:48

## 2018-08-15 RX ADMIN — OXYCODONE HYDROCHLORIDE 5 MG: 5 TABLET ORAL at 02:51

## 2018-08-15 RX ADMIN — ACETAMINOPHEN 975 MG: 325 TABLET, FILM COATED ORAL at 02:51

## 2018-08-15 NOTE — PROGRESS NOTES
Post Partum Progress Note  POD#2  Subjective:  She is resting comfortably in bed this morning. Pain is improving and is well controlled on current medication regimen. Tolerating PO intake.  Lochia present and is light.  Voiding without difficulty. Passing flatus, no BM yet. Ambulating without dizziness or difficulty.  Breastfeeding well. No questions or concerns. Plans to discharge tomorrow.     Objective:  Vitals:    18 0519 18 0742 18 1423 18 2115   BP: 103/76 106/77 136/89 125/89   Pulse:    81   Resp: 16 16 16 16   Temp: 98.9  F (37.2  C) 98.4  F (36.9  C) 97.8  F (36.6  C) 98.2  F (36.8  C)   TempSrc: Oral Oral Oral Oral   SpO2: 95% 95%     Weight:       Height:           General: NAD. A&Ox3.  CV: Regular rate, well perfused  Pulm: Normal respiratory effort.  Abd: Soft, non-tender, non-distended. Fundus is firm and below the umbilicus.    Incision: Steri-strips in place, c/d/i without drainage  Ext: 1+ edema bilaterally. No calf tenderness.    # Pain Assessment:  Current Pain Score 8/15/2018   Patient currently in pain? sleeping: patient not able to self report   Pain location -   Pain descriptors -   - Adenike is experiencing pain due to  section. Pain management was discussed and the plan was created in a collaborative fashion.  Adenike's response to the current recommendations: engaged  - Opioid regimen: Oxycodone 5-10 mg q4h  - Response to opioid medications: Reduction of symptoms   - Bowel regimen: senna  - Pharmacologic adjuvants: NSAIDs and Acetaminophen    Assessment/Plan:  Adenike Chavez is a 32 year old  female who is POD#2 s/p RLTCS.    - Encourage routine post-operative goals including ambulation and incentive spirometry  - PNC: Rh pos. Rubella immune. No intervention indicated.  - Pain: controlled on oral medications   - Heme: Hgb 11.9> >9.6. Will discharge home with iron.  - GI: continue anti-emetics and stool softeners as needed.  - : S/p juares, voiding  spontaneously.  - Infant: Stable in room  - Feeding: Plans on breastfeeding.  - BC: Plans on Paraguard    Discharge to home on POD#3    Fatmata Srivastava MD  Ob/Gyn PGY-2  08/15/18 6:31 AM     Staff MD Note    I appreciate the note by Dr. Srivastava.  Any necessary changes have been made by me.  I evaluated the patient with the resident and agree with the assessment and plan.    Rita Diaz MD

## 2018-08-15 NOTE — PLAN OF CARE
Problem: Patient Care Overview  Goal: Plan of Care/Patient Progress Review  Outcome: Improving  VSS. Postpartum assessment WNL. Breastfeeding independently. Pain well controlled with oral pain medications. Pt reports passing flatus however no BM yet. Bonding well with . Significant other present at the bedside and supportive. Need notary this am for ROP. No further concerns at this time.

## 2018-08-15 NOTE — PROVIDER NOTIFICATION
08/14/18 1947   Provider Notification   Provider Name/Title G2 Resident   Method of Notification Electronic Page   Request Evaluate-Remote   Notification Reason Medication Request  (d/c toradol pt over 24 hrs )   2192 P.V Can you please d/c her toradol order. Pt is over 24 hrs and now getting IB. thank you Elizabet [id number]

## 2018-08-15 NOTE — PROGRESS NOTES
Post  Anesthesia Follow Up Note    Patient: Adenike Chavez    Patient location: Postpartum floor.    Chief complaint: Acute postoperative pain managment    Procedure(s) Performed:  COMBINED  SECTION, TUBAL LIGATION    Anesthesia type: Spinal anesthesia and transversus abdominus plane (TAP) nerve block        Subjective:   The patient reports good pain control.  Pain Intensity: Moderate.  Patient reports soreness near her incision site but says it is manageable. She is able to ambulate and tolerates regular diet. Voiding and passing flatus but no BM. No issues or concerns.        Objective:  Respiratory Function (RR / SpO2 / Airway Patency): Satisfactory    Cardiac Function (HR / Rhythm / BP): Satisfactory    Strength and sensation lower extremities: Strength 5/5 and grossly symmetric bilateral LE    Last Vitals: /85  Pulse 81  Temp 36.7  C (98  F) (Oral)  Resp 16  Ht 1.524 m (5')  Wt 86.5 kg (190 lb 11.2 oz)  LMP 10/25/2017  SpO2 95%  Breastfeeding? Unknown  BMI 37.24 kg/m2      Assessment and Plan:   Adenike Chavez is a 32 year old female POD #2 s/p  COMBINED  SECTION, TUBAL LIGATION with spinal anesthetic and TAP block.  Pt is ambulating without difficulty, no weakness or paresthesias.  No evidence of adverse side effects associated with spinal and nerve block injections. Pt is receiving adequate incisional pain control.      - NO other local anesthetic use within 96 hours of liposome bupivacaine (Exparel) long acting  - patient received verbal and written instructions about liposome bupivacaine   - please call if questions or concerns    Randall Olivier MD  8/15/2018 6:02 PM

## 2018-08-16 VITALS
HEIGHT: 60 IN | SYSTOLIC BLOOD PRESSURE: 139 MMHG | TEMPERATURE: 98.5 F | RESPIRATION RATE: 16 BRPM | DIASTOLIC BLOOD PRESSURE: 77 MMHG | BODY MASS INDEX: 37.44 KG/M2 | OXYGEN SATURATION: 95 % | WEIGHT: 190.7 LBS | HEART RATE: 77 BPM

## 2018-08-16 PROCEDURE — 25000132 ZZH RX MED GY IP 250 OP 250 PS 637: Performed by: OBSTETRICS & GYNECOLOGY

## 2018-08-16 PROCEDURE — 25000132 ZZH RX MED GY IP 250 OP 250 PS 637: Performed by: STUDENT IN AN ORGANIZED HEALTH CARE EDUCATION/TRAINING PROGRAM

## 2018-08-16 RX ADMIN — IBUPROFEN 800 MG: 800 TABLET, FILM COATED ORAL at 02:41

## 2018-08-16 RX ADMIN — OXYCODONE HYDROCHLORIDE 10 MG: 5 TABLET ORAL at 03:38

## 2018-08-16 RX ADMIN — OXYCODONE HYDROCHLORIDE 10 MG: 5 TABLET ORAL at 00:41

## 2018-08-16 RX ADMIN — ACETAMINOPHEN 975 MG: 325 TABLET, FILM COATED ORAL at 03:38

## 2018-08-16 RX ADMIN — OXYCODONE HYDROCHLORIDE 10 MG: 5 TABLET ORAL at 08:25

## 2018-08-16 RX ADMIN — IBUPROFEN 800 MG: 800 TABLET, FILM COATED ORAL at 08:25

## 2018-08-16 RX ADMIN — SENNOSIDES AND DOCUSATE SODIUM 2 TABLET: 8.6; 5 TABLET ORAL at 08:25

## 2018-08-16 NOTE — PROGRESS NOTES
Post Partum Progress Note  POD#3  Subjective:  She is resting comfortably in bed this morning. Pain is improving and is well controlled on current medication regimen. Reports musculoskeletal back soreness, relieved with massage. Tolerating PO intake. Lochia present and is light.  Voiding without difficulty. Passing flatus, no BM yet. Ambulating without dizziness or difficulty.  Breastfeeding well, s/p tx for infant's ankyloglossia yesterday afternoon. Has been wearing compression socks for her bilateral leg edema, with improvement of her swelling noted. No questions or concerns. Plans to discharge today.     Objective:  Vitals:    18 1423 /18 2115 08/15/18 0835 08/15/18 1621   BP: 136/89 125/89 129/83 134/85   Pulse:  81     Resp: 16 16 18 16   Temp: 97.8  F (36.6  C) 98.2  F (36.8  C) 98.3  F (36.8  C) 98  F (36.7  C)   TempSrc: Oral Oral Oral Oral   SpO2:       Weight:       Height:         General: NAD. A&Ox3.  CV: Regular rate, well perfused  Pulm: Normal respiratory effort.  Abd: Soft, non-tender, non-distended. Fundus is firm and below the umbilicus.    Incision: Steri-strips in place, c/d/i without drainage  Ext: 2+ edema bilaterally, wearing compression stockings. No calf tenderness.    # Pain Assessment:  Current Pain Score 2018   Patient currently in pain? denies   Pain location -   Pain descriptors -   - Adenike is experiencing pain due to  section. Pain management was discussed and the plan was created in a collaborative fashion.  Adenike's response to the current recommendations: engaged  - Opioid regimen: Oxycodone 5-10 mg q4h  - Response to opioid medications: Reduction of symptoms   - Bowel regimen: senna  - Pharmacologic adjuvants: NSAIDs and Acetaminophen    Assessment/Plan:  Adenike Chavez is a 32 year old  female who is POD#3 s/p RLTCS.    - Encourage routine post-operative goals including ambulation and incentive spirometry  - PNC: Rh pos. Rubella immune. No intervention  indicated.  - Pain: controlled on oral medications   - Heme: Hgb 11.9> >9.6. Will discharge home with iron.  - GI: continue anti-emetics and stool softeners as needed.  - : S/p juares, voiding spontaneously.  - Infant: Stable in room  - Feeding: Plans on breastfeeding.  - BC: Plans on Paraguard    Discharge to home on POD#3    Fatmata Srivastava MD  Ob/Gyn PGY-2  08/16/18 6:53 AM     Appreciate Dr. Srivastava's note above, patient also seen and examined by me. I agree with the note above.   Tyra Parham MD

## 2018-08-16 NOTE — PLAN OF CARE
Problem: Patient Care Overview  Goal: Plan of Care/Patient Progress Review  Outcome: Improving  Took care of patient from [id number]. Post partum assessment WDL. Lactation saw her this evening for pain on left nipple and breastfeeding support. Pain well controlled with Tylenol, oxycodone, and ibuprofen. No stool yet; passing gas. Continue to monitor and notify MD of any changes or concerns.     Hourly Rounding Completed.

## 2018-08-16 NOTE — PLAN OF CARE
Problem: Patient Care Overview  Goal: Plan of Care/Patient Progress Review  Outcome: Improving  VS WNL, Pt up and about in room tolerating well, Pain well managed per Pt with motrin tylenol and Lucero . Breast feeding going well Pt independent with baby cares. See flow sheet for assessments.

## 2018-08-16 NOTE — PLAN OF CARE
Problem: Patient Care Overview  Goal: Plan of Care/Patient Progress Review  Outcome: Adequate for Discharge Date Met: 08/16/18  Data: Vital signs stable, assessments within normal limits. no signs of infection noted. Patient discharge information, medication given and  instructed of signs/symptoms to look for and report per discharge instructions.   Discharge outcomes on care plan met. No apparent pain.  Action: Review of care plan, teach back, and discharge instructions done with patient. Infant identification with ID bands done,  verification with signature obtained.   Response:  Patient states understanding and comfort with self cares. All questions about self care addressed. patient discharged at 1117 with infant.

## 2018-09-25 ENCOUNTER — OFFICE VISIT (OUTPATIENT)
Dept: OBGYN | Facility: CLINIC | Age: 33
End: 2018-09-25
Attending: OBSTETRICS & GYNECOLOGY
Payer: COMMERCIAL

## 2018-09-25 VITALS — HEIGHT: 60 IN | BODY MASS INDEX: 32.57 KG/M2 | WEIGHT: 165.9 LBS

## 2018-09-25 DIAGNOSIS — Z30.430 ENCOUNTER FOR INSERTION OF INTRAUTERINE CONTRACEPTIVE DEVICE: Primary | ICD-10-CM

## 2018-09-25 LAB
HCG UR QL: NEGATIVE
INTERNAL QC OK POCT: YES

## 2018-09-25 PROCEDURE — 58300 INSERT INTRAUTERINE DEVICE: CPT | Mod: ZF | Performed by: OBSTETRICS & GYNECOLOGY

## 2018-09-25 PROCEDURE — 81025 URINE PREGNANCY TEST: CPT | Mod: ZF | Performed by: OBSTETRICS & GYNECOLOGY

## 2018-09-25 PROCEDURE — 25000125 ZZHC RX 250: Mod: ZF

## 2018-09-25 PROCEDURE — G0463 HOSPITAL OUTPT CLINIC VISIT: HCPCS

## 2018-09-25 RX ORDER — COPPER 313.4 MG/1
1 INTRAUTERINE DEVICE INTRAUTERINE CONTINUOUS
Start: 2018-09-25

## 2018-09-25 NOTE — PROGRESS NOTES
"6 week Postpartum Visit Note    S:  Ms. Adenike Chavez is a 33 year old  here for her 6-week postpartum checkup.   - Had a RLTCS on 2018   - Infant gender:  girl, weight 7 pounds 15.7 oz.  - Feeding Method:  Exclusively breastfeeding, no issues  - Bleeding:  Still spotting when she's really active on weekends, uses a panty liner. Overall continuing to decrease. Does not believe shes had a perioid  - Bowel/Urinary problems:  No  - Mood: okay, states first couple weeks were difficult PHQ-9 score: 0  - Contraception Planned:  IUD Paragard  - She has had intercourse since delivery and experienced mild discomfort due to dryness. No unprotected intercourse.  - Current tobacco use:  No  - Incision - Reports it is well healed. States she notices an odor under her pannus when it is warm outside. Also notes intermittent pain along the left side of the incision.  ================================================================  ROS: 10 point ROS neg other than the symptoms noted above in the HPI.     O:  Ht 1.524 m (5')  Wt 75.3 kg (165 lb 14.4 oz)  LMP 10/25/2017  BMI 32.4 kg/m2  Gen: Well-appearing, NAD  Abd:  Benign, Soft, non-tender, no masses  Inc:   well healed, dry and intact   Pelvic Exam:  Vulva: No external lesions, normal hair distribution, normal architecture  Vagina: Moist, pink, no abnormal discharge, well rugated, no lesions  Cervix: smooth, pink, no visible lesions  Uterus: Normal size, anteverted, non-tender, mobile  Adnexa: Non-tender, no masses    IUD Insertion Note:    Time Out - \"Pause for the Cause\"  Just before the procedure begins, through verbal and active participation of team members, verify:    Initials   Patient Name    [facility]   Patient Date of Birth [facility]   Procedure to be performed  [facility]     Consent:  Verbal consent obtained from patient. , Risks, benefits of treatment, and no treatment were discussed.  Patient's questions were elicited and answered.  and Written consent signed and scanned " into medical record.    UPT was negative    After informed consent was obtained from the patient, a medium pertersen speculum was placed in the vagina to  visualized the cervix.  The cervix was then swabbed with a betadine prep x 3.  Tenaculum was placed at the 12 o'clock position on the cervix and the uterus sounded to 8.5cm.  The Paragard IUD was then placed in the usual fashion under sterile technique.  Strings were clipped about 3 cm from the cervical os.  Tenaculum was removed and cervix was made hemostatic with pressure and silver nitrate.  There were no complications.    The patient tolerated the procedure well.   Exp 2024, Lot [id number]    A/P:  Ms. Adenike Chavez is a 33 year old  here for 6 week postpartum visit after RLTCS. Doing well today.  - Contraception: Paraguard IUD, placed today. Instructed to return to clinic in 4-6 weeks for string check if unable to check strings herself  - Incision Pain: Incision well healed, but she notes with occasional pain on left side of incision. We discussed that likely related to normal healing and should improve over the next few months  - Feeding: Breast  - Follow-up: 1 year or sooner if concerns    Kelin Spence MD  OB/GYN Resident, PGY-1  2018 9:43 AM        S Staff Note:  I examined Adenike Chavez on 2018 with Dr. Spence and agree with the presentation, exam and plan of care documented in this note with edits by me. I was present for the entire procedure as noted above.   Darby Harkins MD  18

## 2018-09-25 NOTE — LETTER
"2018       RE: Adenike Chavez  [address] [place] [address]     Dear Colleague,    Thank you for referring your patient, Adenike Chavez, to the [facility] at [facility]. Please see a copy of my visit note below.    6 week Postpartum Visit Note    S:  Ms. Adenike Chavez is a 33 year old  here for her 6-week postpartum checkup.   - Had a RLTCS on 2018   - Infant gender:  girl, weight 7 pounds 15.7 oz.  - Feeding Method:  Exclusively breastfeeding, no issues  - Bleeding:  Still spotting when she's really active on weekends, uses a panty liner. Overall continuing to decrease. Does not believe shes had a perioid  - Bowel/Urinary problems:  No  - Mood: okay, states first couple weeks were difficult PHQ-9 score: 0  - Contraception Planned:  IUD Paragard  - She has had intercourse since delivery and experienced mild discomfort due to dryness. No unprotected intercourse.  - Current tobacco use:  No  - Incision - Reports it is well healed. States she notices an odor under her pannus when it is warm outside. Also notes intermittent pain along the left side of the incision.  ================================================================  ROS: 10 point ROS neg other than the symptoms noted above in the HPI.     O:  Ht 1.524 m (5')  Wt 75.3 kg (165 lb 14.4 oz)  LMP 10/25/2017  BMI 32.4 kg/m2  Gen: Well-appearing, NAD  Abd:  Benign, Soft, non-tender, no masses  Inc:   well healed, dry and intact   Pelvic Exam:  Vulva: No external lesions, normal hair distribution, normal architecture  Vagina: Moist, pink, no abnormal discharge, well rugated, no lesions  Cervix: smooth, pink, no visible lesions  Uterus: Normal size, anteverted, non-tender, mobile  Adnexa: Non-tender, no masses    IUD Insertion Note:    Time Out - \"Pause for the Cause\"  Just before the procedure begins, through verbal and active participation of team members, verify:    Initials "   Patient Name    [facility]   Patient Date of Birth [facility]   Procedure to be performed  [facility]     Consent:  Verbal consent obtained from patient. , Risks, benefits of treatment, and no treatment were discussed.  Patient's questions were elicited and answered.  and Written consent signed and scanned into medical record.    UPT was negative    After informed consent was obtained from the patient, a medium pertersen speculum was placed in the vagina to  visualized the cervix.  The cervix was then swabbed with a betadine prep x 3.  Tenaculum was placed at the 12 o'clock position on the cervix and the uterus sounded to 8.5cm.  The Paragard IUD was then placed in the usual fashion under sterile technique.  Strings were clipped about 3 cm from the cervical os.  Tenaculum was removed and cervix was made hemostatic with pressure and silver nitrate.  There were no complications.    The patient tolerated the procedure well.   Exp 2024, Lot [id number]    A/P:  Ms. Adenike Chavez is a 33 year old  here for 6 week postpartum visit after RLTCS. Doing well today.  - Contraception: Paraguard IUD, placed today. Instructed to return to clinic in 4-6 weeks for string check if unable to check strings herself  - Incision Pain: Incision well healed, but she notes with occasional pain on left side of incision. We discussed that likely related to normal healing and should improve over the next few months  - Feeding: Breast  - Follow-up: 1 year or sooner if concerns    Kelin Spence MD  OB/GYN Resident, PGY-1  2018 9:43 AM        S Staff Note:  I examined Adenike Chavez on 2018 with Dr. Spence and agree with the presentation, exam and plan of care documented in this note with edits by me. I was present for the entire procedure as noted above.   Darby Harkins MD  18      Again, thank you for allowing me to participate in the care of your patient.      Sincerely,    Darby Harkins MD

## 2018-09-25 NOTE — NURSING NOTE
SUBJECTIVE:   Adenike Chavez is here for her 6-week postpartum checkup.     PHQ-9 score:   Hx of Abuse:  No    Delivery Date: 18.    Delivering provider:  Darby Harkins MD.    Type of delivery:  Repeat .     Delivery complications: None  Infant gender:  girl, weight 7 pounds 15 oz.  Feeding Method:  .  Complications reported with feeding:  none, infant thriving .    Bleeding:  Light.  Duration:  6 weeks.  Menses resumed:  No  Bowel/Urinary problems:  No    Contraception Planned:  IUD Paragard  She  has had intercourse since delivery and experienced  No discomfort.  .

## 2018-09-25 NOTE — MR AVS SNAPSHOT
After Visit Summary   9/25/2018    Adenike Chavez    MRN: [id number]           Patient Information     Date Of Birth          1985        Visit Information        Provider Department      9/25/2018 9:45 AM Darby Harkins MD [facility]        Today's Diagnoses     Encounter for insertion of intrauterine contraceptive device    -  1       Follow-ups after your visit        Who to contact     Please call your clinic at [phone] to:    Ask questions about your health    Make or cancel appointments    Discuss your medicines    Learn about your test results    Speak to your doctor            Additional Information About Your Visit        MyChart Information     [employer] [facility] gives you secure access to your electronic health record. If you see a primary care provider, you can also send messages to your care team and make appointments. If you have questions, please call your primary care clinic.  If you do not have a primary care provider, please call [phone] and they will assist you.      [employer] is an electronic gateway that provides easy, online access to your medical records. With [employer], you can request a clinic appointment, read your test results, renew a prescription or communicate with your care team.     To access your existing account, please contact your [facility] or call [phone] for assistance.        Care EveryWhere ID     This is your Care EveryWhere ID. This could be used by other organizations to access your [place] medical records  [id number]        Your Vitals Were     Height Last Period BMI (Body Mass Index)             1.524 m (5') 10/25/2017 32.4 kg/m2          Blood Pressure from Last 3 Encounters:   08/16/18 139/77   08/07/18 126/85   08/01/18 128/84    Weight from Last 3 Encounters:   09/25/18 75.3 kg (165 lb 14.4 oz)   08/13/18 86.5 kg (190 lb 11.2 oz)   08/07/18 86.5 kg (190 lb 11.2 oz)              We Performed  the Following     hCG qual urine POCT     INSERTION INTRAUTERINE DEVICE     INTRAUT COPPER CONTRACEPTIVE          Today's Medication Changes          These changes are accurate as of 9/25/18 11:59 PM.  If you have any questions, ask your nurse or doctor.               Start taking these medicines.        Dose/Directions    paragard intrauterine copper   Used for:  Encounter for insertion of intrauterine contraceptive device   Started by:  Darby Harkins MD        Dose:  1 each   1 each by Intrauterine route continuous   Refills:  0         Stop taking these medicines if you haven't already. Please contact your care team if you have questions.     acetaminophen 325 MG tablet   Commonly known as:  TYLENOL   Stopped by:  Daryb Harkins MD           breast pump Misc   Stopped by:  Darby Harkins MD           calcium carbonate 500 MG chewable tablet   Commonly known as:  TUMS   Stopped by:  Darby Harkins MD           ferrous sulfate 325 (65 Fe) MG tablet   Commonly known as:  IRON   Stopped by:  Darby Harkins MD           ibuprofen 600 MG tablet   Commonly known as:  ADVIL/MOTRIN   Stopped by:  Darby Harkins MD           oxyCODONE IR 5 MG tablet   Commonly known as:  ROXICODONE   Stopped by:  Darby Harkins MD           senna-docusate 8.6-50 MG per tablet   Commonly known as:  SENOKOT-S;PERICOLACE   Stopped by:  Darby Harkins MD           ZANTAC PO   Stopped by:  Darby Harkins MD                Where to get your medicines      Some of these will need a paper prescription and others can be bought over the counter.  Ask your nurse if you have questions.     You don't need a prescription for these medications     paragard intrauterine copper                Primary Care Provider Fax #    Physician No Ref-Primary [phone]       No address on file        Equal Access to Services     DUANE SAMPSON AH: efrain Fernández qaybta kaalmada adeegyada,  lulu seguramaria burger'aan ah. So [facility] [phone].    ATENCIÓN: Si habla natali, tiene a burleson disposición servicios gratuitos de asistencia lingüística. Sabina al [phone].    We comply with applicable federal civil rights laws and [place] laws. We do not discriminate on the basis of race, color, national origin, age, disability, sex, sexual orientation, or gender identity.            Thank you!     Thank you for choosing [facility]  for your care. Our goal is always to provide you with excellent care. Hearing back from our patients is one way we can continue to improve our services. Please take a few minutes to complete the written survey that you may receive in the mail after your visit with us. Thank you!             Your Updated Medication List - Protect others around you: Learn how to safely use, store and throw away your medicines at www.disposemymeds.org.          This list is accurate as of 9/25/18 11:59 PM.  Always use your most recent med list.                   Brand Name Dispense Instructions for use Diagnosis    paragard intrauterine copper      1 each by Intrauterine route continuous    Encounter for insertion of intrauterine contraceptive device       prenatal multivitamin plus iron 27-0.8 MG Tabs per tablet      Take 1 tablet by mouth daily

## 2018-09-25 NOTE — LETTER
Date:September 27, 2018      Patient was self referred, no letter generated. Do not send.        [facility] Health Information

## 2019-02-18 ENCOUNTER — TELEPHONE (OUTPATIENT)
Dept: OTHER | Facility: CLINIC | Age: 34
End: 2019-02-18

## 2019-10-04 ENCOUNTER — HEALTH MAINTENANCE LETTER (OUTPATIENT)
Age: 34
End: 2019-10-04

## 2020-02-09 ENCOUNTER — OFFICE VISIT (OUTPATIENT)
Dept: ORTHOPEDICS | Facility: CLINIC | Age: 35
End: 2020-02-09
Payer: COMMERCIAL

## 2020-02-09 ENCOUNTER — ANCILLARY PROCEDURE (OUTPATIENT)
Dept: GENERAL RADIOLOGY | Facility: CLINIC | Age: 35
End: 2020-02-09
Attending: FAMILY MEDICINE
Payer: COMMERCIAL

## 2020-02-09 DIAGNOSIS — M25.571 ACUTE BILATERAL ANKLE PAIN: ICD-10-CM

## 2020-02-09 DIAGNOSIS — M25.571 ACUTE BILATERAL ANKLE PAIN: Primary | ICD-10-CM

## 2020-02-09 DIAGNOSIS — M25.572 ACUTE BILATERAL ANKLE PAIN: ICD-10-CM

## 2020-02-09 DIAGNOSIS — M25.572 ACUTE BILATERAL ANKLE PAIN: Primary | ICD-10-CM

## 2020-02-09 NOTE — PROGRESS NOTES
SPORTS & ORTHOPEDIC WALK-IN VISIT 2/9/2020    Primary Care Physician: Dr. Boyd is here today with her  for bilateral ankle pain, right worse than left.  The patient thought she was on the last step in a staircase yesterday and jumped down landing awkwardly on the step below.  She had immediate pain in her ankles and was unable to stand.  Today she is having continued pain with weightbearing in the right ankle.  Pain is slightly improved on the left and she can stand still without pain.  Most of her pain localizes to the lateral ankles and somewhat anteriorly bilaterally.  She has noticed some increase in swelling and developing bruising.  She has had ankle sprains previously but no significant ankle injury.  She has a throbbing pain at rest.  No tingling or numbness.     Reason for visit:     What part of your body is injured / painful?  bilateral ankle    What caused the injury /pain? Fell down stairs     How long ago did your injury occur or pain begin? yesterday    What are your most bothersome symptoms? Pain and Swelling    How would you characterize your symptom?  aching and dull    What makes your symptoms better? Nothing    What makes your symptoms worse? Movement    Have you been previously seen for this problem? No    Medical History:    Any recent changes to your medical history? No    Any new medication prescribed since last visit? No    Have you had surgery on this body part before? No    Social History:    Occupation: [employer]     Handedness: Right    Exercise:     Review of Systems:    Do you have fever, chills, weight loss? No    Do you have any vision problems? No    Do you have any chest pain or edema? No    Do you have any shortness of breath or wheezing?  No    Do you have stomach problems? No    Do you have any numbness or focal weakness? No    Do you have diabetes? No    Do you have problems with bleeding or clotting? No    Do you have an rashes or other skin lesions? No          Past Medical History, Current Medications, and Allergies are reviewed in the electronic medical record as appropriate.       EXAM:There were no vitals taken for this visit.    General: Alert, pleasant, no distress  Bilateral ankles: warm, well perfused, SILT throughout     Inspection: Soft tissue swelling over the lateral and anterior ankles, right worse than left.  Small developing ecchymosis over the anterior ankle on the right.     ROM: No significant restriction.  She does have some pain with passive plantarflexion and inversion bilaterally.     Strength: She is able to dorsiflex, plantarflex, invert and javier with no significant weakness.  Does have some pain with eversion against resistance on the right side.     Palpation: TTP over the distal fibula and lateral malleolus as well as in the distribution of the ATFL and anteriorly over the distal tibia and tibiotalar joint on the right.  On the left she has some tenderness over the distal fibula and ATFL.  There is no tenderness of the midfoot, calcaneus, navicular, fifth metatarsal laterally.     Special Tests: None      Imaging: X-rays of the bilateral ankles are performed and reviewed independently demonstrating no acute fractures or dislocations.  No significant degenerative disease.  See EMR for formal radiology report.      Assessment: Patient is a 34 year old female with bilateral ankle pain after a fall yesterday.  Left ankle is consistent with lateral ankle sprain relatively low-grade is suspected.  On the right, she likely has a lateral ankle sprain as well though have some concern for syndesmotic sprain as some of her pain does localize anteriorly.    Recommendations:   Reviewed imaging and assessment with the patient and her  in detail  I provided a walking boot to use as needed for comfort.  May also consider a period of crutches if she is unable to ambulate comfortably in boot.  She will use the boot when ambulatory for up to 2 weeks.   Can transition out as able based on comfort.  If she is still having difficulty standing/walking out of the boot after 2 weeks she will follow-up for reevaluation.  Additionally have recommended icing, elevation, and continued PRN NSAID use.    Ramesh Jaime MD

## 2020-10-21 ENCOUNTER — RECORDS - HEALTHEAST (OUTPATIENT)
Dept: LAB | Facility: CLINIC | Age: 35
End: 2020-10-21

## 2020-10-21 LAB
SARS-COV-2 PCR COMMENT: NORMAL
SARS-COV-2 RNA SPEC QL NAA+PROBE: NEGATIVE
SARS-COV-2 VIRUS SPECIMEN SOURCE: NORMAL

## 2020-11-08 ENCOUNTER — HEALTH MAINTENANCE LETTER (OUTPATIENT)
Age: 35
End: 2020-11-08

## 2021-04-06 ENCOUNTER — AMBULATORY - HEALTHEAST (OUTPATIENT)
Dept: FAMILY MEDICINE | Facility: CLINIC | Age: 36
End: 2021-04-06

## 2021-04-06 ENCOUNTER — E-VISIT (OUTPATIENT)
Dept: URGENT CARE | Facility: CLINIC | Age: 36
End: 2021-04-06
Payer: COMMERCIAL

## 2021-04-06 ENCOUNTER — OFFICE VISIT - HEALTHEAST (OUTPATIENT)
Dept: FAMILY MEDICINE | Facility: CLINIC | Age: 36
End: 2021-04-06

## 2021-04-06 DIAGNOSIS — Z20.822 SUSPECTED COVID-19 VIRUS INFECTION: Primary | ICD-10-CM

## 2021-04-06 DIAGNOSIS — Z20.822 SUSPECTED COVID-19 VIRUS INFECTION: ICD-10-CM

## 2021-04-06 DIAGNOSIS — J02.9 SORE THROAT: ICD-10-CM

## 2021-04-06 LAB
DEPRECATED S PYO AG THROAT QL EIA: NORMAL
GROUP A STREP BY PCR: NORMAL
SARS-COV-2 PCR RESULT-HE - HISTORICAL: NEGATIVE

## 2021-04-06 PROCEDURE — 99421 OL DIG E/M SVC 5-10 MIN: CPT | Performed by: NURSE PRACTITIONER

## 2021-04-06 NOTE — PATIENT INSTRUCTIONS
Dear Adenike Chavez,    Your symptoms show that you may have coronavirus (COVID-19). This illness can cause fever, cough and trouble breathing. Many people get a mild case and get better on their own. Some people can get very sick.    Will I be tested for COVID-19?  We would like to test you for Covid-19 virus. I have placed orders for this test.     For all employees or close contacts (except [place] and [employer] - see below), go to your [facility] home page and scroll down to the section that says  You have an appointment that needs to be scheduled  and click the large green button that says  Schedule Now  and follow the steps to find the next available opening.     If you are unable to complete these steps or if you cannot find any available times, please call [phone] to schedule employee testing.     [place] employees or close contacts, please call [phone].   [place] [employer] employees or close contacts call [phone].    Return to work/school/ guidance:  Please let your workplace manager and staffing office know when your quarantine ends     We can t give you an exact date as it depends on the above. You can calculate this on your own or work with your manager/staffing office to calculate this. (For example if you were exposed on 10/4, you would have to quarantine for 14 full days. That would be through 10/18. You could return on 10/19.)      If you receive a positive COVID-19 test result, follow the guidance of the those who are giving you the results. Usually the return to work is 10 (or in some cases 20 days from symptom onset.) If you work at [employer], you must also be cleared by Employee Occupational Health and Safety to return to work.        If you receive a negative COVID-19 test result and did not have a high risk exposure to someone with a known positive COVID-19 test, you can return to work once you're free of fever for 24 hours without fever-reducing medication and your  symptoms are improving or resolved.      If you receive a negative COVID-19 test and If you had a high risk exposure to someone who has tested positive for COVID-19 then you can return to work 14 days after your last contact with the positive individual    Note: If you have ongoing exposure to the covid positive person, this quarantine period may be more than 14 days. (For example, if you are continued to be exposed to your child who tested positive and cannot isolate from them, then the quarantine of 7-14 days can't start until your child is no longer contagious. This is typically 10 days from onset of the child's symptoms. So the total duration may be 17-24 days in this case.)    Sign up for [employer].   We know it's scary to hear that you might have COVID-19. We want to track your symptoms to make sure you're okay over the next 2 weeks. Please look for an email from [employer]--this is a free, online program that we'll use to keep in touch. To sign up, follow the link in the email you will receive. Learn more at [email or web address]    How can I take care of myself?    Get lots of rest. Drink extra fluids (unless a doctor has told you not to)    Take Tylenol (acetaminophen) or ibuprofen for fever or pain. If you have liver or kidney problems, ask your family doctor if it's okay to take Tylenol o ibuprofen    If you have other health problems (like cancer, heart failure, an organ transplant or severe kidney disease): Call your specialty clinic if you don't feel better in the next 2 days.    Know when to call 911. Emergency warning signs include:  o Trouble breathing or shortness of breath  o Pain or pressure in the chest that doesn't go away  o Feeling confused like you haven't felt before, or not being able to wake up  o Bluish-colored lips or face    Where can I get more information?   [facility] - About COVID-19:   [email or web address]    CDC - What to Do If You're Sick:    www.cdc.gov/coronavirus/2019-ncov/about/steps-when-sick.html    April 6, 2021  RE:  Adenike Chavez                                                                                                                  [address] [place] [address]      To whom it may concern:    I evaluated Adenike Chavez on April 6, 2021. Adenike Chavez should be excused from work/school.     They should let their workplace manager and staffing office know when their quarantine ends.    We can not give an exact date as it depends on the information below. They can calculate this on their own or work with their manager/staffing office to calculate this. (For example if they were exposed on 10/04, they would have to quarantine for 14 full days. That would be through 10/18. They could return on 10/19.)    Quarantine Guidelines:      If patient receives a positive COVID-19 test result, they should follow the guidance of those who are giving the results. Usually the return to work is 10 (or in some cases 20 days from symptom onset.) If they work at [employer], they must be cleared by Employee Occupational Health and Safety to return to work.        If patient receives a negative COVID-19 test result and did not have a high risk exposure to someone with a known positive COVID-19 test, they can return to work once they're free of fever for 24 hours without fever-reducing medication and their symptoms are improving or resolved.      If patient receives a negative COVID-19 test and if they had a high risk exposure to someone who has tested positive for COVID-19 then they can return to work 14 days after their last contact with the positive individual    Note: If there is ongoing exposure to the covid positive person, this quarantine period may be longer than 14 days. (For example, if they are continually exposed to their child, who tested positive and cannot isolate from them, then the quarantine of 7-14 days can't start until their child  is no longer contagious. This is typically 10 days from onset to the child's symptoms. So the total duration may be 17-24 days in this case.)     Sincerely,  MAGDA Burnett CNP

## 2021-04-07 ENCOUNTER — COMMUNICATION - HEALTHEAST (OUTPATIENT)
Dept: SCHEDULING | Facility: CLINIC | Age: 36
End: 2021-04-07

## 2021-05-04 ENCOUNTER — OFFICE VISIT (OUTPATIENT)
Dept: OPHTHALMOLOGY | Facility: CLINIC | Age: 36
End: 2021-05-04
Payer: COMMERCIAL

## 2021-05-04 DIAGNOSIS — H52.13 MYOPIA OF BOTH EYES: Primary | ICD-10-CM

## 2021-05-04 PROCEDURE — V2520 CONTACT LENS HYDROPHILIC: HCPCS | Performed by: OPTOMETRIST

## 2021-05-04 NOTE — PROGRESS NOTES
No office visit, bill contact lenses    Contact Lens Billing  V-Code:  - Soft spherical     # of units: 2, 1  Price per Unit: $30, $66.25  Total to be billed: $126.25    These are for cosmetic contact lenses.    Encounter Diagnosis   Name Primary?     Myopia of both eyes Yes      Priscilla Adams COT May 4, 2021 12:55 PM

## 2021-05-26 ENCOUNTER — RECORDS - HEALTHEAST (OUTPATIENT)
Dept: ADMINISTRATIVE | Facility: CLINIC | Age: 36
End: 2021-05-26

## 2021-06-16 NOTE — PATIENT INSTRUCTIONS - HE
Dear Adenike Chavez,    Your symptoms show that you may have coronavirus (COVID-19). This illness can cause fever, cough and trouble breathing. Many people get a mild case and get better on their own. Some people can get very sick.    Because you also reported sore throat I would like to also test you for Strep Throat to determine if we need to treat you for that as well.    What should I do?  We would like to test you for Covid-19 virus and Strep Throat. I have placed orders for these tests.     For all employees or close contacts (except [place] and [employer] - see below), go to your [facility] home page and scroll down to the section that says  You have an appointment that needs to be scheduled  and click the large green button that says  Schedule Now  and follow the steps to find the next available opening.  It is important that when you are asked what the reason for your appointment is that you mention you need BOTH Covid and Strep tests.  tests.     If you are unable to complete these steps or if you cannot find any available times, please call [phone] to schedule employee testing.     [place] employees or close contacts, please call [phone].   [place] [employer]) employees or close contacts call [phone].    Return to work/school/ guidance:  Please let your workplace manager and staffing office know when your quarantine ends     We can t give you an exact date as it depends on the above. You can calculate this on your own or work with your manager/staffing office to calculate this. (For example if you were exposed on 10/4, you would have to quarantine for 14 full days. That would be through 10/18. You could return on 10/19.)      If you receive a positive COVID-19 test result, follow the guidance of the those who are giving you the results. Usually the return to work is 10 (or in some cases 20 days from symptom onset.) If you work at [facility], you must also be cleared by Employee  Occupational Health and Safety to return to work.        If you receive a negative COVID-19 test result and did not have a high risk exposure to someone with a known positive COVID-19 test, you can return to work once you're free of fever for 24 hours without fever-reducing medication and your symptoms are improving or resolved.      If you receive a negative COVID-19 test and If you had a high risk exposure to someone who has tested positive for COVID-19 then you can return to work 14 days after your last contact with the positive individual    Note: If you have ongoing exposure to the covid positive person, this quarantine period may be more than 14 days. (For example, if you are continued to be exposed to your child who tested positive and cannot isolate from them, then the quarantine of 7-14 days can't start until your child is no longer contagious. This is typically 10 days from onset of the child's symptoms. So the total duration may be 17-24 days in this case.)    Sign up for [employer].   We know it's scary to hear that you might have COVID-19. We want to track your symptoms to make sure you're okay over the next 2 weeks. Please look for an email from [employer]--this is a free, online program that we'll use to keep in touch. To sign up, follow the link in the email you will receive. Learn more at [email or web address]    How can I take care of myself?    Get lots of rest. Drink extra fluids (unless a doctor has told you not to)    Take Tylenol (acetaminophen) or ibuprofen for fever or pain. If you have liver or kidney problems, ask your family doctor if it's okay to take Tylenol o ibuprofen    If you have other health problems (like cancer, heart failure, an organ transplant or severe kidney disease): Call your specialty clinic if you don't feel better in the next 2 days.    Know when to call 911. Emergency warning signs include:  o Trouble breathing or shortness of breath  o Pain or  pressure in the chest that doesn't go away  o Feeling confused like you haven't felt before, or not being able to wake up  o Bluish-colored lips or face    Where can I get more information?  [facility] - About COVID-19:   [email or web address]    CDC - What to Do If You're Sick:   www.cdc.gov/coronavirus/2019-ncov/about/steps-when-sick.html        April 6, 2021  RE:  Adenike Chavez                                                                                                                  [address] [place] [address]      To whom it may concern:    I evaluated Adenike Chavez on 04/06/21. Adenike Chavez should be excused from work/school.     They should let their workplace manager and staffing office know when their quarantine ends.    We can not give an exact date as it depends on the information below. They can calculate this on their own or work with their manager/staffing office to calculate this. (For example if they were exposed on 10/04, they would have to quarantine for 14 full days. That would be through 10/18. They could return on 10/19.)    Quarantine Guidelines:      If patient receives a positive COVID-19 test result, they should follow the guidance of those who are giving the results. Usually the return to work is 10 (or in some cases 20 days from symptom onset.) If they work at [facility], they must be cleared by Employee Occupational Health and Safety to return to work.        If patient receives a negative COVID-19 test result and did not have a high risk exposure to someone with a known positive COVID-19 test, they can return to work once they're free of fever for 24 hours without fever-reducing medication and their symptoms are improving or resolved.      If patient receives a negative COVID-19 test and if they had a high risk exposure to someone who has tested positive for COVID-19 then they can return to work 14 days after their last contact with the positive  individual    Note: If there is ongoing exposure to the covid positive person, this quarantine period may be longer than 14 days. (For example, if they are continually exposed to their child, who tested positive and cannot isolate from them, then the quarantine of 7-14 days can't start until their child is no longer contagious. This is typically 10 days from onset to the child's symptoms. So the total duration may be 17-24 days in this case.)    Sincerely,  Maycol Umaña PA-C

## 2021-06-23 NOTE — TELEPHONE ENCOUNTER
FUTURE VISIT INFORMATION      FUTURE VISIT INFORMATION:    Date: 7/13/21    Time: 8:20am    Location: [facility]  REFERRAL INFORMATION:    RECORDS REQUESTED FROM:       Clinic name Comments Records Status Imaging Status   [facility] 5/4/21

## 2021-07-13 ENCOUNTER — PRE VISIT (OUTPATIENT)
Dept: OPHTHALMOLOGY | Facility: CLINIC | Age: 36
End: 2021-07-13

## 2021-09-11 ENCOUNTER — HEALTH MAINTENANCE LETTER (OUTPATIENT)
Age: 36
End: 2021-09-11

## 2021-11-03 ENCOUNTER — OFFICE VISIT (OUTPATIENT)
Dept: OPHTHALMOLOGY | Facility: CLINIC | Age: 36
End: 2021-11-03

## 2021-11-03 DIAGNOSIS — H52.13 MYOPIA OF BOTH EYES: Primary | ICD-10-CM

## 2021-11-03 PROCEDURE — V2520 CONTACT LENS HYDROPHILIC: HCPCS | Performed by: OPTOMETRIST

## 2021-11-04 NOTE — PROGRESS NOTES
Contact Lens Billing  V-Code:  - Soft spherical     # of units: 2, 2  Price per Unit: $73.42, $66.25  Total: $279.34    These are for cosmetic contact lenses.    Encounter Diagnosis   Name Primary?     Myopia of both eyes Yes        Date of last eye exam: 2020    Order number: [id number] - [facility]     Priscilla Angie OATES November 4, 2021 12:41 PM

## 2022-01-01 ENCOUNTER — HEALTH MAINTENANCE LETTER (OUTPATIENT)
Age: 37
End: 2022-01-01

## 2022-02-17 PROBLEM — O09.91 SUPERVISION OF HIGH RISK PREGNANCY IN FIRST TRIMESTER: Status: ACTIVE | Noted: 2017-12-29

## 2022-06-03 ENCOUNTER — OFFICE VISIT (OUTPATIENT)
Dept: FAMILY MEDICINE | Facility: CLINIC | Age: 37
End: 2022-06-03
Payer: COMMERCIAL

## 2022-06-03 ENCOUNTER — E-VISIT (OUTPATIENT)
Dept: URGENT CARE | Facility: CLINIC | Age: 37
End: 2022-06-03
Payer: COMMERCIAL

## 2022-06-03 VITALS
OXYGEN SATURATION: 99 % | WEIGHT: 174.1 LBS | RESPIRATION RATE: 22 BRPM | TEMPERATURE: 98.4 F | BODY MASS INDEX: 34 KG/M2 | DIASTOLIC BLOOD PRESSURE: 99 MMHG | HEART RATE: 89 BPM | SYSTOLIC BLOOD PRESSURE: 151 MMHG

## 2022-06-03 DIAGNOSIS — R05.8 DRY COUGH: ICD-10-CM

## 2022-06-03 DIAGNOSIS — Z86.16 HISTORY OF COVID-19: Primary | ICD-10-CM

## 2022-06-03 DIAGNOSIS — R05.9 COUGH: Primary | ICD-10-CM

## 2022-06-03 DIAGNOSIS — R03.0 ELEVATED BLOOD PRESSURE READING WITHOUT DIAGNOSIS OF HYPERTENSION: ICD-10-CM

## 2022-06-03 PROCEDURE — 99207 PR NON-BILLABLE SERV PER CHARTING: CPT | Performed by: NURSE PRACTITIONER

## 2022-06-03 PROCEDURE — 99213 OFFICE O/P EST LOW 20 MIN: CPT | Performed by: NURSE PRACTITIONER

## 2022-06-03 ASSESSMENT — ENCOUNTER SYMPTOMS
WHEEZING: 0
FEVER: 0
COUGH: 1
SORE THROAT: 1
VOMITING: 1
SHORTNESS OF BREATH: 0
MYALGIAS: 0

## 2022-06-03 NOTE — PATIENT INSTRUCTIONS
Dear Adenike Chavez,    We are sorry you are not feeling well. Based on the responses you provided, it is recommended that you be seen in-person in urgent care so we can better evaluate your symptoms. Please click here to find the nearest urgent care location to you.   You will not be charged for this Visit. Thank you for trusting us with your care.    MAGDA Morales CNP

## 2022-06-03 NOTE — PATIENT INSTRUCTIONS
Try cough suppressant delsym or Dayquil/Nyquil + cough drops     Tea with honey     Throat coat tea     Stop Sudafed - that can raise BP     Get blood pressure checked when better.      Recheck if new fevers or shortness with breath

## 2022-06-03 NOTE — PROGRESS NOTES
"Assessment & Plan     History of COVID-19      Dry cough      Elevated blood pressure reading without diagnosis of hypertension         Focused exam and history done due to COVID-19 pandemic in a walk-in setting.      Post COVID dry cough with posttussive emesis.  Normal lung sounds today.  Reassurance that this is normal and should resolve hopefully within the next 1 to 2 weeks on average.    Recommend stopping Sudafed p.m. as she does have high blood pressure currently and does not have any more congestion.  Recheck blood pressure when feeling better.    Switch to cough suppressant such as Delsym.     Recheck if shortness of breath or new fevers develop.  Rest.     OTCs recommended: None [   ].  Dextromethorphan  [ x ], guaifenesin [  ], pseudoephedrine [   ], Afrin for no greater than 3 days [  ], Tylenol or ibuprofen [  ].  Tea with honey, throat coat tea.                  Return in about 2 weeks (around 6/17/2022) for If no better.    Chary Hernández, [facility]     Adenike is a 36 year old female who presents to clinic today for the following health issues:  Chief Complaint   Patient presents with     Cough     Persistent dry cough on 5/6. At home covid positive 5/9. \"Throat is sore and a little swollen\". Vomit when cough too hard. Congested.     HPI    Positive home COVID test on May 9.    Ongoing coughing since then.  Mostly dry.  Coughs up phlegm in the morning.      Had fever x 2-3 days.      SOB only with cough.      No asthma.  Non smoker.     No known outdoor allergies.      Takes Sudafed PM to sleep.  Hypertensive today.  Has family history of same.  Normally not this high.    Cough drops     Coughs to point of vomiting.            Review of Systems   Constitutional: Negative for fever.   HENT: Positive for sore throat (+ Irritation). Negative for congestion.    Respiratory: Positive for cough. Negative for shortness of breath and wheezing.    Gastrointestinal: " Positive for vomiting (Posttussive).   Musculoskeletal: Negative for myalgias.           Objective    BP (!) 151/99 (BP Location: Right arm, Patient Position: Sitting, Cuff Size: Adult Regular)   Pulse 89   Temp 98.4  F (36.9  C) (Oral)   Resp 22   Wt 79 kg (174 lb 1.6 oz)   LMP 05/15/2022 (Exact Date)   SpO2 99%   BMI 34.00 kg/m    Physical Exam  Constitutional:       General: She is not in acute distress.     Appearance: She is well-developed.   HENT:      Nose: No congestion.   Eyes:      General:         Right eye: No discharge.         Left eye: No discharge.      Conjunctiva/sclera: Conjunctivae normal.   Cardiovascular:      Rate and Rhythm: Normal rate and regular rhythm.      Pulses: Normal pulses.      Heart sounds: Normal heart sounds.   Pulmonary:      Effort: Pulmonary effort is normal.      Breath sounds: Normal breath sounds.      Comments: Frequent dry cough throughout history and exam  Musculoskeletal:         General: Normal range of motion.   Skin:     General: Skin is warm and dry.      Capillary Refill: Capillary refill takes less than 2 seconds.   Neurological:      Mental Status: She is alert and oriented to person, place, and time.   Psychiatric:         Mood and Affect: Mood normal.         Behavior: Behavior normal.         Thought Content: Thought content normal.         Judgment: Judgment normal.

## 2022-10-30 ENCOUNTER — HEALTH MAINTENANCE LETTER (OUTPATIENT)
Age: 37
End: 2022-10-30

## 2022-11-23 ENCOUNTER — OFFICE VISIT (OUTPATIENT)
Dept: FAMILY MEDICINE | Facility: CLINIC | Age: 37
End: 2022-11-23
Payer: COMMERCIAL

## 2022-11-23 VITALS
HEART RATE: 83 BPM | WEIGHT: 172.3 LBS | RESPIRATION RATE: 18 BRPM | OXYGEN SATURATION: 98 % | DIASTOLIC BLOOD PRESSURE: 86 MMHG | TEMPERATURE: 98.5 F | SYSTOLIC BLOOD PRESSURE: 126 MMHG | BODY MASS INDEX: 33.65 KG/M2

## 2022-11-23 DIAGNOSIS — R09.89 SUSPECTED NOVEL INFLUENZA A VIRUS INFECTION: Primary | ICD-10-CM

## 2022-11-23 PROCEDURE — 99213 OFFICE O/P EST LOW 20 MIN: CPT | Performed by: PHYSICIAN ASSISTANT

## 2022-11-23 ASSESSMENT — ENCOUNTER SYMPTOMS
COUGH: 1
FEVER: 1
SORE THROAT: 1

## 2022-11-23 NOTE — PROGRESS NOTES
Patient presents with:  Cough: Pt states started 3 day cough,fever.runny nose,headache,sore throat and congestion       Clinical Decision Making:  Patient experiencing sick symptoms for 2 to 3 days.  Suspect influenza A.  Patient is not in high risk group.  Physical exam is benign.  We discussed supportive cares.      ICD-10-CM    1. Suspected novel influenza A virus infection  R09.89           Patient Instructions     1. Take Tylenol or Ibuprofen as needed for fever relief.   2. Increase fluids and rest.  3. Influenza is typically considered contagious one day prior and 5-7 days after your symptoms begin. I recommend returning to work/school after you are fever free for 24 hours. Continue to practice good hand hygiene and cough coverage well after you are fever free.   4. Follow up if you develop fever that can not be controlled, difficulty breathing, or severe dehydration.    Influenza  Influenza ( the flu ) is an infection that affects your respiratory tract (the mouth, nose, and lungs, and the passages between them). Unlike a cold, the flu can make you very ill. And it can lead to pneumonia, a serious lung infection. For some people, especially older adults, young children, and people with certain chronic conditions, the flu can have serious complications and even be fatal.  How Does the Flu Spread?  The flu is caused by viruses. The viruses spread through the air in droplets when someone who has the flu coughs, sneezes, laughs, or talks. You can become infected when you inhale these viruses directly. You can also become infected when you touch a surface on which the droplets have landed and then transfer the germs to your eyes, nose, or mouth. Touching used tissues, or sharing utensils, drinking glasses, or a toothbrush with an infected person can expose you to flu viruses, too.  What Are the Symptoms of the Flu?  Flu symptoms tend to come on quickly and may last a few days to a few weeks. They  include:    Fever usually higher than 101 F  (38.3 C) and chills    Sore throat and headache    Dry cough    Runny nose    Tiredness and weakness    Muscle aches  Factors That Can Make Flu Worse  For some people, the flu can be very serious. The risk of complications is greater for:    Children under age 5.    Adults 65 years of age and older.    People with a chronic illness, such as diabetes or heart, kidney, or lung disease.    People who live in a nursing home or long-term care facility.   How Is the Flu Treated?  Influenza usually improves after 7 days or so. In some cases, your health care provider may prescribe an antiviral medication. This may help you get well sooner. For the medication to help, you need to take it as soon as possible (ideally within 48 hours) after your symptoms start. If you develop pneumonia or other serious illness, hospital care may be needed.  Easing Flu Symptoms    Drink lots of fluids such as water, juice, and warm soup. A good rule is to drink enough so that you urinate your normal amount.    Get plenty of rest.    Ask your health care provider what to take for fever and pain.    Call your provider if your fever rises over 101 F (38.3 C) or you become dizzy, lightheaded, or short of breath.        HPI:  Adenike Chavez is a 37 year old female who presents today complaining of cough that started 3 days ago.  Patient also experiencing fever, runny nose, headache, sore throat, nasal congestion. Family member has influenza a.     History obtained from the patient.    Problem List:  2018: Status post   2018: S/P  section  2018: Vitamin D deficiency  2017: Supervision of high risk pregnancy in first trimester  2014: S/P  section  2014: Labor and delivery indication for care or intervention  2014: Amniotic fluid leaking  2014: High-risk pregnancy  2014: Vitamin D deficiency  2014: Previous  delivery, antepartum condition or    complication  2012-12: Contraception  2010-08: Hypertension  2010-06: Other specified complication of pregnancy, unspecified as to   episode of care  2010-06: Abnormal maternal glucose tolerance, complicating pregnancy,   childbirth, or the puerperium, unspecified as to episode of care  2010-02: Supervision of normal first pregnancy      Past Medical History:   Diagnosis Date     NO ACTIVE PROBLEMS        Social History     Tobacco Use     Smoking status: Never     Smokeless tobacco: Never   Substance Use Topics     Alcohol use: No     Comment: Not with pregnancy - once every 2 months       Review of Systems   Constitutional: Positive for fever (102.9).   HENT: Positive for sore throat.    Respiratory: Positive for cough.        Vitals:    11/23/22 1008   BP: 126/86   BP Location: Right arm   Patient Position: Sitting   Cuff Size: Adult Regular   Pulse: 83   Resp: 18   Temp: 98.5  F (36.9  C)   TempSrc: Oral   SpO2: 98%   Weight: 78.2 kg (172 lb 4.8 oz)       Physical Exam  Vitals and nursing note reviewed.   Constitutional:       General: She is not in acute distress.     Appearance: She is not toxic-appearing or diaphoretic.   HENT:      Head: Normocephalic and atraumatic.      Right Ear: External ear normal.      Left Ear: External ear normal.   Eyes:      Conjunctiva/sclera: Conjunctivae normal.   Cardiovascular:      Rate and Rhythm: Normal rate and regular rhythm.      Heart sounds: No murmur heard.  Pulmonary:      Effort: Pulmonary effort is normal. No respiratory distress.      Breath sounds: No stridor. No wheezing, rhonchi or rales.   Neurological:      Mental Status: She is alert.   Psychiatric:         Mood and Affect: Mood normal.         Behavior: Behavior normal.         Thought Content: Thought content normal.         Judgment: Judgment normal.           At the end of the encounter, I discussed results, diagnosis, medications. Discussed red flags for immediate return to clinic/ER, as well as  indications for follow up if no improvement. Patient understood and agreed to plan. Patient was stable for discharge.

## 2022-11-23 NOTE — PATIENT INSTRUCTIONS
1. Take Tylenol or Ibuprofen as needed for fever relief.   2. Increase fluids and rest.  3. Influenza is typically considered contagious one day prior and 5-7 days after your symptoms begin. I recommend returning to work/school after you are fever free for 24 hours. Continue to practice good hand hygiene and cough coverage well after you are fever free.   4. Follow up if you develop fever that can not be controlled, difficulty breathing, or severe dehydration.    Influenza  Influenza ( the flu ) is an infection that affects your respiratory tract (the mouth, nose, and lungs, and the passages between them). Unlike a cold, the flu can make you very ill. And it can lead to pneumonia, a serious lung infection. For some people, especially older adults, young children, and people with certain chronic conditions, the flu can have serious complications and even be fatal.  How Does the Flu Spread?  The flu is caused by viruses. The viruses spread through the air in droplets when someone who has the flu coughs, sneezes, laughs, or talks. You can become infected when you inhale these viruses directly. You can also become infected when you touch a surface on which the droplets have landed and then transfer the germs to your eyes, nose, or mouth. Touching used tissues, or sharing utensils, drinking glasses, or a toothbrush with an infected person can expose you to flu viruses, too.  What Are the Symptoms of the Flu?  Flu symptoms tend to come on quickly and may last a few days to a few weeks. They include:  Fever usually higher than 101 F  (38.3 C) and chills  Sore throat and headache  Dry cough  Runny nose  Tiredness and weakness  Muscle aches  Factors That Can Make Flu Worse  For some people, the flu can be very serious. The risk of complications is greater for:  Children under age 5.  Adults 65 years of age and older.  People with a chronic illness, such as diabetes or heart, kidney, or lung disease.  People who live in a  nursing home or long-term care facility.   How Is the Flu Treated?  Influenza usually improves after 7 days or so. In some cases, your health care provider may prescribe an antiviral medication. This may help you get well sooner. For the medication to help, you need to take it as soon as possible (ideally within 48 hours) after your symptoms start. If you develop pneumonia or other serious illness, hospital care may be needed.  Easing Flu Symptoms  Drink lots of fluids such as water, juice, and warm soup. A good rule is to drink enough so that you urinate your normal amount.  Get plenty of rest.  Ask your health care provider what to take for fever and pain.  Call your provider if your fever rises over 101 F (38.3 C) or you become dizzy, lightheaded, or short of breath.

## 2023-04-08 ENCOUNTER — HEALTH MAINTENANCE LETTER (OUTPATIENT)
Age: 38
End: 2023-04-08

## 2023-11-29 ENCOUNTER — OFFICE VISIT (OUTPATIENT)
Dept: FAMILY MEDICINE | Facility: CLINIC | Age: 38
End: 2023-11-29
Payer: COMMERCIAL

## 2023-11-29 VITALS
DIASTOLIC BLOOD PRESSURE: 90 MMHG | HEART RATE: 107 BPM | OXYGEN SATURATION: 99 % | BODY MASS INDEX: 34.36 KG/M2 | WEIGHT: 175 LBS | RESPIRATION RATE: 16 BRPM | SYSTOLIC BLOOD PRESSURE: 145 MMHG | HEIGHT: 60 IN

## 2023-11-29 DIAGNOSIS — R03.0 ELEVATED BLOOD-PRESSURE READING WITHOUT DIAGNOSIS OF HYPERTENSION: ICD-10-CM

## 2023-11-29 DIAGNOSIS — J02.9 PHARYNGITIS, UNSPECIFIED ETIOLOGY: ICD-10-CM

## 2023-11-29 DIAGNOSIS — R05.8 POST-VIRAL COUGH SYNDROME: Primary | ICD-10-CM

## 2023-11-29 PROCEDURE — 99213 OFFICE O/P EST LOW 20 MIN: CPT | Performed by: FAMILY MEDICINE

## 2023-11-29 RX ORDER — IPRATROPIUM BROMIDE 42 UG/1
2 SPRAY, METERED NASAL 4 TIMES DAILY
Qty: 15 ML | Refills: 0 | Status: SHIPPED | OUTPATIENT
Start: 2023-11-29 | End: 2024-04-15

## 2023-11-29 RX ORDER — GUAIFENESIN 200 MG/10ML
200 LIQUID ORAL EVERY 4 HOURS PRN
Qty: 180 ML | Refills: 1 | Status: SHIPPED | OUTPATIENT
Start: 2023-11-29 | End: 2024-04-15

## 2023-11-29 NOTE — PROGRESS NOTES
Assessment & Plan     Adenike Chavez is a 38 year old female seen today for the following:    (R05.8) Post-viral cough syndrome  (primary encounter diagnosis)  (J02.9) Pharyngitis, unspecified etiology  Comment: History seems consistent with a viral pharyngitis leading to laryngitis.  Suspect colonized by GAS, but will complete antibiotics course as prescribed.  Additionally discussed other symptomatic therapies including Atrovent nasal spray to assist with postnasal drip, can try Robitussin for cough, and over-the-counter lozenges to assist with sore throat.  Plan: ipratropium (ATROVENT) 0.06 % nasal spray,         guaiFENesin (ROBITUSSIN) 20 mg/mL liquid          (R03.0) Elevated blood-pressure reading without diagnosis of hypertension  Comment: Possibly primary hypertension, however, currently difficult to fully evaluate in context of acute illness.  Recommended to continue to monitor her blood pressures at least a few times a week at various times of the day over the next month.  Record these and follow-up to review.    I spent a total of 24 minutes on the day of the visit.   Time spent by me doing chart review, history and exam, documentation and further activities per the note    Benjamin Rosenstein, MD, MA  [facility]    Elsa Jeong is a 38 year old, presenting for the following health issues:  RECHECK (Follow up b/p)    HPI     Seen today for concerns of a cough and also of elevated blood pressures.    2 weeks ago she started having symptoms.  Initially her daughter had a day long at most coughing illness.  Soon after, she developed a sore throat and fever.  Had a cough right away as well.  Led to loss of her voice.  Had significant tonsillar inflammation and says she saw them touching.  She continued to have a sore throat a week later and went to the [facility] clinic.  Did rapid strep test there and was positive.  She was started on  penicillin at that time of which she is on day 7 of 10.    Her cough is dry and is particularly worse at night.  Also worse in the morning when she wakes up and feels she has to cough stuff out.  Does have some soreness in her throat related to this as well.  No significant shortness of breath.  She does feels worse outside in the cold weather.  She tried an old nebulizer of her child's but did not find it very helpful.  She has not tried anything else for cough.    In addition, she notes that her blood pressures recently have been more elevated.  She at the [facility] her blood pressure was 140/90.  She does have an arm cuff at home and blood pressures have been similar in the last couple weeks as well.  She reports a family history of hypertension as well.        Objective    BP (!) 145/90   Pulse 107   Resp 16   Ht 1.524 m (5')   Wt 79.4 kg (175 lb)   SpO2 99%   BMI 34.18 kg/m    Body mass index is 34.18 kg/m .  Physical Exam     General: Awake, seated comfortably, appears well and NAD   HEENT:  - Head: Atraumatic, normocephalic  - Eyes: Corneas clear, sclera without injection, no discharge, EOMI, PERRLA  - Nose: Nares patent, no rhinorrhea, no congestion  - Throat: Oropharynx clear without lesions, tonsils mildly erythematous, no exudates  Lymph: No anterior/posterior cervical or occipital lymphadenopathy   CV: RRR, normal S1/S2, no murmurs/rubs/gallops  Pulm: Normal WOB, lungs CTAB, no wheezes or crackles, good air movement   Neuro: Alert, answering questions appropriately, normal thought processes

## 2023-11-29 NOTE — PATIENT INSTRUCTIONS
Use the atrovent nasal in both nostrils 4 times a day  Also look into a saline nasal spray which you can use as you need    Throat lozenges such as ricola or sucrets may be helpful.    Try to robitussin to see if it assists with your cough.

## 2024-04-15 ENCOUNTER — OFFICE VISIT (OUTPATIENT)
Dept: FAMILY MEDICINE | Facility: CLINIC | Age: 39
End: 2024-04-15
Payer: COMMERCIAL

## 2024-04-15 VITALS
WEIGHT: 184.6 LBS | DIASTOLIC BLOOD PRESSURE: 94 MMHG | HEART RATE: 82 BPM | RESPIRATION RATE: 12 BRPM | OXYGEN SATURATION: 99 % | SYSTOLIC BLOOD PRESSURE: 140 MMHG | TEMPERATURE: 97.6 F | BODY MASS INDEX: 34.85 KG/M2 | HEIGHT: 61 IN

## 2024-04-15 DIAGNOSIS — R03.0 ELEVATED BLOOD PRESSURE READING WITHOUT DIAGNOSIS OF HYPERTENSION: ICD-10-CM

## 2024-04-15 DIAGNOSIS — R63.5 WEIGHT GAIN: Primary | ICD-10-CM

## 2024-04-15 DIAGNOSIS — Z13.220 SCREENING FOR LIPID DISORDERS: ICD-10-CM

## 2024-04-15 LAB
ERYTHROCYTE [DISTWIDTH] IN BLOOD BY AUTOMATED COUNT: 12.6 % (ref 10–15)
HBA1C MFR BLD: 5.6 % (ref 0–5.6)
HCT VFR BLD AUTO: 44.7 % (ref 35–47)
HGB BLD-MCNC: 14.3 G/DL (ref 11.7–15.7)
MCH RBC QN AUTO: 27.3 PG (ref 26.5–33)
MCHC RBC AUTO-ENTMCNC: 32 G/DL (ref 31.5–36.5)
MCV RBC AUTO: 85 FL (ref 78–100)
PLATELET # BLD AUTO: 296 10E3/UL (ref 150–450)
RBC # BLD AUTO: 5.24 10E6/UL (ref 3.8–5.2)
WBC # BLD AUTO: 8.9 10E3/UL (ref 4–11)

## 2024-04-15 PROCEDURE — 80053 COMPREHEN METABOLIC PANEL: CPT

## 2024-04-15 PROCEDURE — 85027 COMPLETE CBC AUTOMATED: CPT

## 2024-04-15 PROCEDURE — 83036 HEMOGLOBIN GLYCOSYLATED A1C: CPT

## 2024-04-15 PROCEDURE — 99213 OFFICE O/P EST LOW 20 MIN: CPT

## 2024-04-15 PROCEDURE — 36415 COLL VENOUS BLD VENIPUNCTURE: CPT

## 2024-04-15 PROCEDURE — 84443 ASSAY THYROID STIM HORMONE: CPT

## 2024-04-15 PROCEDURE — 80061 LIPID PANEL: CPT

## 2024-04-15 NOTE — PROGRESS NOTES
"  Assessment & Plan     Weight gain  Increased stress and sedentary job. Generally feeling unwell and uncomfortable with her weight gain. Will evaluate with labs. She is following at a wellness clinic for possible initiation of semaglutide. I did offer to refer her to weight management specialty or MTM for weight loss options, she declined for now.   - Comprehensive metabolic panel (BMP + Alb, Alk Phos, ALT, AST, Total. Bili, TP)  - CBC with platelets  - TSH with free T4 reflex  - Lipid panel reflex to direct LDL Non-fasting  - Hemoglobin A1c    Screening for lipid disorders  - Lipid panel reflex to direct LDL Fasting    Elevated blood pressure reading without diagnosis of hypertension  Mildly elevated blood pressure. Discussed lifestyle modifications. Will check labs and will recheck at her preventative visit next month.   - Comprehensive metabolic panel (BMP + Alb, Alk Phos, ALT, AST, Total. Bili, TP)  - TSH with free T4 reflex      BMI  Estimated body mass index is 35.46 kg/m  as calculated from the following:    Height as of this encounter: 1.537 m (5' 0.5\").    Weight as of this encounter: 83.7 kg (184 lb 9.6 oz).   Weight management plan: Discussed healthy diet and exercise guidelines    Plan to follow up at her preventative visit in one month.    Elsa Jeong is a 38 year old, presenting for the following health issues:  Blood Pressure (Pt has concerns about blood pressure- feeling fatigue and numbness in hands, water retention in legs/feet, \"just doesn't feel good after she eats\". /Has been at a desk job lately, and states she is at the heaviest weight she has been at. ) and Recheck Medication (Semiglutide- wanting to check into getting blood work done for this/)        4/15/2024    10:48 AM   Additional Questions   Roomed by CAITLIN Wolf     History of Present Illness       Reason for visit:  No checkup since 2018,  Symptom onset:  More than a month  Symptoms include:  Numbness in hands when waking, " "water retention, feeling crappy after eating,  Symptom intensity:  Moderate  Symptom progression:  Worsening  Had these symptoms before:  No  What makes it worse:  Eating, weightgain    She eats 2-3 servings of fruits and vegetables daily.She consumes 0 sweetened beverage(s) daily.She exercises with enough effort to increase her heart rate 9 or less minutes per day.  She exercises with enough effort to increase her heart rate 3 or less days per week.   She is taking medications regularly.       Works in small [employer]. Increased stress with this new job.     More sedentary job that she was at previously and has gained weight. This is the heaviest she has been in her life. Not exercising routinely but does walks, weight lifts. Has tried keto diet. 3 kids with busy activities. Feels like she retains water, just generally feels unwell with eating. Met with a provider at a wellness clinic. Planning on possibly starting semaglutide, wondering about getting labs prior to starting this. No family history of thyroid disease/cancer.    Family history of high blood pressure, siblings. Noticed some mildly elevated blood pressures. Concerned.    Had eyes exam 2 years ago.      Review of Systems  Constitutional, HEENT, cardiovascular, pulmonary, gi and gu systems are negative, except as otherwise noted.      Objective    BP (!) 140/94 (BP Location: Left arm, Patient Position: Sitting, Cuff Size: Adult Regular)   Pulse 82   Temp 97.6  F (36.4  C) (Tympanic)   Resp 12   Ht 1.537 m (5' 0.5\")   Wt 83.7 kg (184 lb 9.6 oz)   SpO2 99%   BMI 35.46 kg/m    Body mass index is 35.46 kg/m .    Physical Exam   GENERAL: alert and no distress  NECK: no adenopathy, no asymmetry, masses, or scars  RESP: lungs clear to auscultation - no rales, rhonchi or wheezes  CV: regular rate and rhythm, normal S1 S2, no S3 or S4, no murmur, click or rub, no peripheral edema  ABDOMEN: soft, nontender, no hepatosplenomegaly, no masses and " bowel sounds normal  MS: no gross musculoskeletal defects noted, no edema  NEURO: Normal strength and tone, mentation intact and speech normal  PSYCH: mentation appears normal, affect normal/bright          Signed Electronically by: MAGDA Hart CNP

## 2024-04-16 DIAGNOSIS — Z00.00 ROUTINE GENERAL MEDICAL EXAMINATION AT A HEALTH CARE FACILITY: Primary | ICD-10-CM

## 2024-04-16 LAB
ALBUMIN SERPL BCG-MCNC: 4.4 G/DL (ref 3.5–5.2)
ALP SERPL-CCNC: 71 U/L (ref 40–150)
ALT SERPL W P-5'-P-CCNC: 30 U/L (ref 0–50)
ANION GAP SERPL CALCULATED.3IONS-SCNC: 9 MMOL/L (ref 7–15)
AST SERPL W P-5'-P-CCNC: 26 U/L (ref 0–45)
BILIRUB SERPL-MCNC: 0.5 MG/DL
BUN SERPL-MCNC: 14.5 MG/DL (ref 6–20)
CALCIUM SERPL-MCNC: 9.2 MG/DL (ref 8.6–10)
CHLORIDE SERPL-SCNC: 103 MMOL/L (ref 98–107)
CHOLEST SERPL-MCNC: 206 MG/DL
CREAT SERPL-MCNC: 0.86 MG/DL (ref 0.51–0.95)
DEPRECATED HCO3 PLAS-SCNC: 27 MMOL/L (ref 22–29)
EGFRCR SERPLBLD CKD-EPI 2021: 88 ML/MIN/1.73M2
GLUCOSE SERPL-MCNC: 119 MG/DL (ref 70–99)
HDLC SERPL-MCNC: 38 MG/DL
LDLC SERPL CALC-MCNC: 113 MG/DL
NONHDLC SERPL-MCNC: 168 MG/DL
POTASSIUM SERPL-SCNC: 4.1 MMOL/L (ref 3.4–5.3)
PROT SERPL-MCNC: 7.7 G/DL (ref 6.4–8.3)
SODIUM SERPL-SCNC: 139 MMOL/L (ref 135–145)
TRIGL SERPL-MCNC: 275 MG/DL
TSH SERPL DL<=0.005 MIU/L-ACNC: 0.87 UIU/ML (ref 0.3–4.2)

## 2024-05-03 ENCOUNTER — OFFICE VISIT (OUTPATIENT)
Dept: FAMILY MEDICINE | Facility: CLINIC | Age: 39
End: 2024-05-03
Payer: COMMERCIAL

## 2024-05-03 VITALS
TEMPERATURE: 97 F | RESPIRATION RATE: 15 BRPM | WEIGHT: 179.9 LBS | HEART RATE: 75 BPM | SYSTOLIC BLOOD PRESSURE: 136 MMHG | HEIGHT: 60 IN | OXYGEN SATURATION: 100 % | BODY MASS INDEX: 35.32 KG/M2 | DIASTOLIC BLOOD PRESSURE: 91 MMHG

## 2024-05-03 DIAGNOSIS — Z12.4 CERVICAL CANCER SCREENING: ICD-10-CM

## 2024-05-03 DIAGNOSIS — Z00.00 ROUTINE GENERAL MEDICAL EXAMINATION AT A HEALTH CARE FACILITY: Primary | ICD-10-CM

## 2024-05-03 PROCEDURE — 87624 HPV HI-RISK TYP POOLED RSLT: CPT

## 2024-05-03 PROCEDURE — 99395 PREV VISIT EST AGE 18-39: CPT

## 2024-05-03 PROCEDURE — G0145 SCR C/V CYTO,THINLAYER,RESCR: HCPCS

## 2024-05-03 SDOH — HEALTH STABILITY: PHYSICAL HEALTH: ON AVERAGE, HOW MANY DAYS PER WEEK DO YOU ENGAGE IN MODERATE TO STRENUOUS EXERCISE (LIKE A BRISK WALK)?: 3 DAYS

## 2024-05-03 SDOH — HEALTH STABILITY: PHYSICAL HEALTH: ON AVERAGE, HOW MANY MINUTES DO YOU ENGAGE IN EXERCISE AT THIS LEVEL?: 20 MIN

## 2024-05-03 ASSESSMENT — SOCIAL DETERMINANTS OF HEALTH (SDOH): HOW OFTEN DO YOU GET TOGETHER WITH FRIENDS OR RELATIVES?: TWICE A WEEK

## 2024-05-03 NOTE — PATIENT INSTRUCTIONS
Preventive Care Advice   This is general advice given by our system to help you stay healthy. However, your care team may have specific advice just for you. Please talk to your care team about your preventive care needs.  Nutrition  Eat 5 or more servings of fruits and vegetables each day.  Try wheat bread, brown rice and whole grain pasta (instead of white bread, rice, and pasta).  Get enough calcium and vitamin D. Check the label on foods and aim for 100% of the RDA (recommended daily allowance).  Lifestyle  Exercise at least 150 minutes each week   (30 minutes a day, 5 days a week).  Do muscle strengthening activities 2 days a week. These help control your weight and prevent disease.  No smoking.  Wear sunscreen to prevent skin cancer.  Have a dental exam and cleaning every 6 months.  Yearly exams  See your health care team every year to talk about:  Any changes in your health.  Any medicines your care team has prescribed.  Preventive care, family planning, and ways to prevent chronic diseases.  Shots (vaccines)   HPV shots (up to age 26), if you've never had them before.  Hepatitis B shots (up to age 59), if you've never had them before.  COVID-19 shot: Get this shot when it's due.  Flu shot: Get a flu shot every year.  Tetanus shot: Get a tetanus shot every 10 years.  Pneumococcal, hepatitis A, and RSV shots: Ask your care team if you need these based on your risk.  Shingles shot (for age 50 and up).  General health tests  Diabetes screening:  Starting at age 35, Get screened for diabetes at least every 3 years.  If you are younger than age 35, ask your care team if you should be screened for diabetes.  Cholesterol test: At age 39, start having a cholesterol test every 5 years, or more often if advised.  Bone density scan (DEXA): At age 50, ask your care team if you should have this scan for osteoporosis (brittle bones).  Hepatitis C: Get tested at least once in your life.  STIs (sexually transmitted  infections)  Before age 24: Ask your care team if you should be screened for STIs.  After age 24: Get screened for STIs if you're at risk. You are at risk for STIs (including HIV) if:  You are sexually active with more than one person.  You don't use condoms every time.  You or a partner was diagnosed with a sexually transmitted infection.  If you are at risk for HIV, ask about PrEP medicine to prevent HIV.  Get tested for HIV at least once in your life, whether you are at risk for HIV or not.  Cancer screening tests  Cervical cancer screening: If you have a cervix, begin getting regular cervical cancer screening tests at age 21. Most people who have regular screenings with normal results can stop after age 65. Talk about this with your provider.  Breast cancer scan (mammogram): If you've ever had breasts, begin having regular mammograms starting at age 40. This is a scan to check for breast cancer.  Colon cancer screening: It is important to start screening for colon cancer at age 45.  Have a colonoscopy test every 10 years (or more often if you're at risk) Or, ask your provider about stool tests like a FIT test every year or Cologuard test every 3 years.  To learn more about your testing options, visit: https://www.Splitforce/683476.pdf.  For help making a decision, visit: https://bit.ly/ub84468.  Prostate cancer screening test: If you have a prostate and are age 55 to 69, ask your provider if you would benefit from a yearly prostate cancer screening test.  Lung cancer screening: If you are a current or former smoker age 50 to 80, ask your care team if ongoing lung cancer screenings are right for you.  For informational purposes only. Not to replace the advice of your health care provider. Copyright   2023 [employer]. All rights reserved. Clinically reviewed by the [facility]. [employer] 007128 - REV 01/24.    Learning About Stress  What is stress?     Stress is your  body's response to a hard situation. Your body can have a physical, emotional, or mental response. Stress is a fact of life for most people, and it affects everyone differently. What causes stress for you may not be stressful for someone else.  A lot of things can cause stress. You may feel stress when you go on a job interview, take a test, or run a race. This kind of short-term stress is normal and even useful. It can help you if you need to work hard or react quickly. For example, stress can help you finish an important job on time.  Long-term stress is caused by ongoing stressful situations or events. Examples of long-term stress include long-term health problems, ongoing problems at work, or conflicts in your family. Long-term stress can harm your health.  How does stress affect your health?  When you are stressed, your body responds as though you are in danger. It makes hormones that speed up your heart, make you breathe faster, and give you a burst of energy. This is called the fight-or-flight stress response. If the stress is over quickly, your body goes back to normal and no harm is done.  But if stress happens too often or lasts too long, it can have bad effects. Long-term stress can make you more likely to get sick, and it can make symptoms of some diseases worse. If you tense up when you are stressed, you may develop neck, shoulder, or low back pain. Stress is linked to high blood pressure and heart disease.  Stress also harms your emotional health. It can make you gordon, tense, or depressed. Your relationships may suffer, and you may not do well at work or school.  What can you do to manage stress?  You can try these things to help manage stress:   Do something active. Exercise or activity can help reduce stress. Walking is a great way to get started. Even everyday activities such as housecleaning or yard work can help.  Try yoga or south chi. These techniques combine exercise and meditation. You may need  some training at first to learn them.  Do something you enjoy. For example, listen to music or go to a movie. Practice your hobby or do volunteer work.  Meditate. This can help you relax, because you are not worrying about what happened before or what may happen in the future.  Do guided imagery. Imagine yourself in any setting that helps you feel calm. You can use online videos, books, or a teacher to guide you.  Do breathing exercises. For example:  From a standing position, bend forward from the waist with your knees slightly bent. Let your arms dangle close to the floor.  Breathe in slowly and deeply as you return to a standing position. Roll up slowly and lift your head last.  Hold your breath for just a few seconds in the standing position.  Breathe out slowly and bend forward from the waist.  Let your feelings out. Talk, laugh, cry, and express anger when you need to. Talking with supportive friends or family, a counselor, or a gely leader about your feelings is a healthy way to relieve stress. Avoid discussing your feelings with people who make you feel worse.  Write. It may help to write about things that are bothering you. This helps you find out how much stress you feel and what is causing it. When you know this, you can find better ways to cope.  What can you do to prevent stress?  You might try some of these things to help prevent stress:  Manage your time. This helps you find time to do the things you want and need to do.  Get enough sleep. Your body recovers from the stresses of the day while you are sleeping.  Get support. Your family, friends, and community can make a difference in how you experience stress.  Limit your news feed. Avoid or limit time on social media or news that may make you feel stressed.  Do something active. Exercise or activity can help reduce stress. Walking is a great way to get started.  Where can you learn more?  Go to https://www.healthwise.net/patiented  Enter N032 in the  "search box to learn more about \"Learning About Stress.\"  Current as of: October 24, 2023               Content Version: 14.0    9580-9310 [employer].   Care instructions adapted under license by your healthcare professional. If you have questions about a medical condition or this instruction, always ask your healthcare professional. [employer] disclaims any warranty or liability for your use of this information.      "

## 2024-05-03 NOTE — PROGRESS NOTES
Preventive Care Visit  [facility]  MAGDA Hart CNP, Nurse Practitioner Primary Care  May 3, 2024    Assessment & Plan     Routine general medical examination at a health care facility  Preventative exam completed today. Discussed healthy lifestyle recommendations of getting 150 minutes of exercise weekly and eating a healthy diet. Reviewed and updated health maintenance. Pap completed today. Recent labs reviewed.   - REVIEW OF HEALTH MAINTENANCE PROTOCOL ORDERS  - PRIMARY CARE FOLLOW-UP SCHEDULING    Cervical cancer screening  - Pap Screen with HPV - recommended age 30 - 65 years      Plan to follow up in 1 year.       Counseling  Appropriate preventive services were discussed with this patient, including applicable screening as appropriate for fall prevention, nutrition, physical activity, Tobacco-use cessation, weight loss and cognition.  Checklist reviewing preventive services available has been given to the patient.  Reviewed patient's diet, addressing concerns and/or questions.   She is at risk for lack of exercise and has been provided with information to increase physical activity for the benefit of her well-being.   The patient was instructed to see the dentist every 6 months.   She is at risk for psychosocial distress and has been provided with information to reduce risk.         Elsa Jeong is a 38 year old, presenting for the following:  Recheck Medication and Physical (PT IS HERE FOR PHYSICAL)        5/3/2024    10:15 AM   Additional Questions   Roomed by [facility] Care Directive  Patient does not have a Health Care Directive or Living Will: Discussed advance care planning with patient; information given to patient to review.    HPI    Started semaglutide last week for weight loss. Feeling well, some heartburn but no nausea.     Occasionally monitoring BP at home, Averages 130's/80-90's. All of her siblings have borderline blood pressures but none have  been started on medications.    Due for pap.           5/3/2024   General Health   How would you rate your overall physical health? Good   Feel stress (tense, anxious, or unable to sleep) Only a little   (!) STRESS CONCERN      5/3/2024   Nutrition   Three or more servings of calcium each day? Yes   Diet: Regular (no restrictions)   How many servings of fruit and vegetables per day? (!) 2-3   How many sweetened beverages each day? 0-1         5/3/2024   Exercise   Days per week of moderate/strenous exercise 3 days   Average minutes spent exercising at this level 20 min         5/3/2024   Social Factors   Frequency of gathering with friends or relatives Twice a week   Worry food won't last until get money to buy more No   Food not last or not have enough money for food? No   Do you have housing?  Yes   Are you worried about losing your housing? No   Lack of transportation? No   Unable to get utilities (heat,electricity)? No         5/3/2024   Dental   Dentist two times every year? (!) NO         5/3/2024   TB Screening   Were you born outside of the [place]? Yes       Today's PHQ-2 Score:       5/3/2024     7:34 AM   PHQ-2 ( 1999 [employer])   Q1: Little interest or pleasure in doing things 0   Q2: Feeling down, depressed or hopeless 0   PHQ-2 Score 0   Q1: Little interest or pleasure in doing things Not at all   Q2: Feeling down, depressed or hopeless Not at all   PHQ-2 Score 0           5/3/2024   Substance Use   Alcohol more than 3/day or more than 7/wk No   Do you use any other substances recreationally? No     Social History     Tobacco Use    Smoking status: Never    Smokeless tobacco: Never   Substance Use Topics    Alcohol use: No     Comment: Not with pregnancy - once every 2 months    Drug use: No     Mammogram Screening - Patient under 40 years of age: Routine Mammogram Screening not recommended.         5/3/2024   STI Screening   New sexual partner(s) since last STI/HIV test? No     History of abnormal Pap smear:  NO - age 30-65 PAP every 5 years with negative HPV co-testing recommended        Latest Ref Rng & Units 2/2/2018     9:30 AM 2/2/2018     9:15 AM 12/5/2012    11:45 AM   PAP / HPV   PAP (Historical)  NIL   NIL    HPV 16 DNA NEG^Negative  Negative     HPV 18 DNA NEG^Negative  Negative     Other HR HPV NEG^Negative  Negative         Reviewed and updated as needed this visit by Provider                  Lab work is in process  Labs reviewed in EPIC  BP Readings from Last 3 Encounters:   05/03/24 (!) 136/91   04/15/24 (!) 140/94   11/29/23 (!) 145/90    Wt Readings from Last 3 Encounters:   05/03/24 81.6 kg (179 lb 14.4 oz)   04/15/24 83.7 kg (184 lb 9.6 oz)   11/29/23 79.4 kg (175 lb)           Review of Systems  CONSTITUTIONAL: NEGATIVE for fever, chills, change in weight  INTEGUMENTARY/SKIN: NEGATIVE for worrisome rashes, moles or lesions  EYES: NEGATIVE for vision changes or irritation  ENT/MOUTH: NEGATIVE for ear, mouth and throat problems  RESP: NEGATIVE for significant cough or SOB  BREAST: NEGATIVE for masses, tenderness or discharge  CV: NEGATIVE for chest pain, palpitations or peripheral edema  GI: NEGATIVE for nausea, abdominal pain, heartburn, or change in bowel habits  : NEGATIVE for frequency, dysuria, or hematuria  MUSCULOSKELETAL: NEGATIVE for significant arthralgias or myalgia  NEURO: NEGATIVE for weakness, dizziness or paresthesias  ENDOCRINE: NEGATIVE for temperature intolerance, skin/hair changes  HEME: NEGATIVE for bleeding problems  PSYCHIATRIC: NEGATIVE for changes in mood or affect     Objective    Exam  BP (!) 136/91   Pulse 75   Temp 97  F (36.1  C) (Tympanic)   Resp 15   Ht 1.524 m (5')   Wt 81.6 kg (179 lb 14.4 oz)   LMP 03/04/2024 (Approximate)   SpO2 100%   BMI 35.13 kg/m     Estimated body mass index is 35.13 kg/m  as calculated from the following:    Height as of this encounter: 1.524 m (5').    Weight as of this encounter: 81.6 kg (179 lb 14.4 oz).    Physical Exam  GENERAL:  alert and no distress  EYES: Eyes grossly normal to inspection, PERRL and conjunctivae and sclerae normal  HENT: ear canals and TM's normal, nose and mouth without ulcers or lesions  NECK: no adenopathy, no asymmetry, masses, or scars  RESP: lungs clear to auscultation - no rales, rhonchi or wheezes  CV: regular rate and rhythm, normal S1 S2, no S3 or S4, no murmur, click or rub, no peripheral edema  ABDOMEN: soft, nontender, no hepatosplenomegaly, no masses and bowel sounds normal   (female): normal female external genitalia, normal urethral meatus, normal vaginal mucosa  MS: no gross musculoskeletal defects noted, no edema  SKIN: no suspicious lesions or rashes  NEURO: Normal strength and tone, mentation intact and speech normal  PSYCH: mentation appears normal, affect normal/bright      Signed Electronically by: MAGDA Hart CNP

## 2024-05-08 LAB
BKR LAB AP GYN ADEQUACY: NORMAL
BKR LAB AP GYN INTERPRETATION: NORMAL
BKR LAB AP HPV REFLEX: NORMAL
BKR LAB AP LMP: NORMAL
BKR LAB AP PREVIOUS ABNORMAL: NORMAL
PATH REPORT.COMMENTS IMP SPEC: NORMAL
PATH REPORT.COMMENTS IMP SPEC: NORMAL
PATH REPORT.RELEVANT HX SPEC: NORMAL

## 2024-05-11 LAB
HUMAN PAPILLOMA VIRUS 16 DNA: NEGATIVE
HUMAN PAPILLOMA VIRUS 18 DNA: NEGATIVE
HUMAN PAPILLOMA VIRUS FINAL DIAGNOSIS: NORMAL
HUMAN PAPILLOMA VIRUS OTHER HR: NEGATIVE

## 2024-09-19 ENCOUNTER — E-VISIT (OUTPATIENT)
Dept: FAMILY MEDICINE | Facility: CLINIC | Age: 39
End: 2024-09-19
Payer: COMMERCIAL

## 2024-09-19 DIAGNOSIS — N89.8 VAGINAL DISCHARGE: Primary | ICD-10-CM

## 2024-09-19 PROCEDURE — 99421 OL DIG E/M SVC 5-10 MIN: CPT

## 2024-09-20 RX ORDER — FLUCONAZOLE 150 MG/1
150 TABLET ORAL ONCE
Qty: 1 TABLET | Refills: 0 | Status: SHIPPED | OUTPATIENT
Start: 2024-09-20 | End: 2024-09-20

## 2024-09-20 NOTE — PATIENT INSTRUCTIONS
Thank you for choosing us for your care. I have placed an order for a prescription so that you can start treatment. View your full visit summary for details by clicking on the link below. Your pharmacist will able to address any questions you may have about the medication.     If you re not feeling better within 2-3 days, please schedule an appointment.  You can schedule an appointment right here in [facility], or call [phone]  If the visit is for the same symptoms as your eVisit, we ll refund the cost of your eVisit if seen within seven days.    Thank you for choosing us for your care. Given your symptoms, I would like you to do a lab-only visit to determine what is causing them.  I have placed the orders.  Please schedule an appointment with the lab right here in [facility], or call [phone].  I will let you know when the results are back and next steps to take.    Schedule a Lab Only appointment here.     Adenike,  I have sent a prescription for diflucan to your pharmacy which is a one time dose. If your symptoms continue despite taking this medication I will have you come in to complete a vaginal swab so we can see if there is something else causing your symptoms besides a yeast infection. I already put the order in so you can make a lab only appointment to have this completed if you need to.   Take care, MAGDA Hart CNP

## 2024-09-25 ENCOUNTER — LAB (OUTPATIENT)
Dept: LAB | Facility: CLINIC | Age: 39
End: 2024-09-25
Payer: COMMERCIAL

## 2024-09-25 DIAGNOSIS — Z11.59 NEED FOR HEPATITIS C SCREENING TEST: Primary | ICD-10-CM

## 2024-09-25 DIAGNOSIS — N89.8 VAGINAL DISCHARGE: ICD-10-CM

## 2024-09-25 LAB
CLUE CELLS: ABNORMAL
TRICHOMONAS, WET PREP: ABNORMAL
WBC'S/HIGH POWER FIELD, WET PREP: ABNORMAL
YEAST, WET PREP: ABNORMAL

## 2024-09-25 PROCEDURE — 87210 SMEAR WET MOUNT SALINE/INK: CPT

## 2024-10-17 ENCOUNTER — E-VISIT (OUTPATIENT)
Dept: FAMILY MEDICINE | Facility: CLINIC | Age: 39
End: 2024-10-17
Payer: COMMERCIAL

## 2024-10-17 DIAGNOSIS — R30.0 DYSURIA: Primary | ICD-10-CM

## 2024-10-17 PROCEDURE — 99421 OL DIG E/M SVC 5-10 MIN: CPT

## 2024-10-18 RX ORDER — NITROFURANTOIN 25; 75 MG/1; MG/1
100 CAPSULE ORAL 2 TIMES DAILY
Qty: 10 CAPSULE | Refills: 0 | Status: SHIPPED | OUTPATIENT
Start: 2024-10-18 | End: 2024-10-23

## 2024-10-18 NOTE — PATIENT INSTRUCTIONS
Dear Adenike Chavez    After reviewing your responses, I've been able to diagnose you with a urinary tract infection, which is a common infection of the bladder with bacteria.  This is not a sexually transmitted infection, though urinating immediately after intercourse can help prevent infections.  Drinking lots of fluids is also helpful to clear your current infection and prevent the next one.      I have sent a prescription for antibiotics to your pharmacy to treat this infection.    It is important that you take all of your prescribed medication even if your symptoms are improving after a few doses.  Taking all of your medicine helps prevent the symptoms from returning.     If your symptoms worsen, you develop pain in your back or stomach, develop fevers, or are not improving in 5 days, please contact your primary care provider for an appointment or visit any of our convenient Walk-in or Urgent Care Centers to be seen, which can be found on our website here.    Thanks again for choosing us as your health care partner,    MAGDA Hart CNP

## 2025-06-15 ENCOUNTER — HEALTH MAINTENANCE LETTER (OUTPATIENT)
Age: 40
End: 2025-06-15

## (undated) DEVICE — WIPES FOLEY CARE SURESTEP PROVON DFC100

## (undated) DEVICE — STPL SKIN 35W APPOSE 8886803712

## (undated) DEVICE — PREP CHLORAPREP 26ML TINTED ORANGE  260815

## (undated) DEVICE — LINEN GOWN X4 5410

## (undated) DEVICE — CATH TRAY FOLEY SURESTEP 16FR WDRAIN BAG STLK LATEX A300316A

## (undated) DEVICE — GLOVE PROTEXIS BLUE W/NEU-THERA 7.0  2D73EB70

## (undated) DEVICE — ESU PENCIL W/HOLSTER E2350H

## (undated) DEVICE — DRAPE MAYO STAND 23X54 8337

## (undated) DEVICE — SU MONOCRYL 0 CTB-1 36" YB946

## (undated) DEVICE — SU VICRYL 0 CT-1 36" J346H

## (undated) DEVICE — TUBING SUCTION MEDI-VAC 1/4"X20' N620A

## (undated) DEVICE — SU PDS II 0 CT-1 36" Z346H

## (undated) DEVICE — DRAPE IOBAN C-SECTION W/POUCH 30X35" 6657

## (undated) DEVICE — SU PLAIN 0 TIE 54" S104H

## (undated) DEVICE — SOL NACL 0.9% IRRIG 1000ML BOTTLE 2F7124

## (undated) DEVICE — PREP POVIDONE IODINE SOLUTION 10% 120ML

## (undated) DEVICE — LINEN TOWEL PACK X5 5464

## (undated) DEVICE — SU PDS II 0 CTX 60" Z990G

## (undated) DEVICE — SOL WATER IRRIG 1000ML BOTTLE 2F7114

## (undated) DEVICE — LINEN ORTHO PACK 5446

## (undated) DEVICE — PACK C-SECTION LF PL15OTA83B

## (undated) DEVICE — SUCTION MANIFOLD DORNOCH ULTRA CART UL-CL500

## (undated) DEVICE — SU PLAIN 3-0 CT 27" 852H

## (undated) DEVICE — BARRIER SEPRAFILM 5X6" SINGLE SHEET 4301-02

## (undated) DEVICE — SU VICRYL 2-0 CT-1 27" J339H

## (undated) DEVICE — GLOVE PROTEXIS W/NEU-THERA 6.5  2D73TE65

## (undated) DEVICE — ESU GROUND PAD UNIVERSAL W/O CORD

## (undated) RX ORDER — KETOROLAC TROMETHAMINE 30 MG/ML
INJECTION, SOLUTION INTRAMUSCULAR; INTRAVENOUS
Status: DISPENSED
Start: 2018-08-13

## (undated) RX ORDER — FENTANYL CITRATE 50 UG/ML
INJECTION, SOLUTION INTRAMUSCULAR; INTRAVENOUS
Status: DISPENSED
Start: 2018-08-13

## (undated) RX ORDER — PHENYLEPHRINE HCL IN 0.9% NACL 1 MG/10 ML
SYRINGE (ML) INTRAVENOUS
Status: DISPENSED
Start: 2018-08-13

## (undated) RX ORDER — PHENYLEPHRINE HCL IN 0.9% NACL 1 MG/10 ML
SYRINGE (ML) INTRAVENOUS
Status: DISPENSED
Start: 2018-08-14